# Patient Record
Sex: FEMALE | Employment: FULL TIME | URBAN - METROPOLITAN AREA
[De-identification: names, ages, dates, MRNs, and addresses within clinical notes are randomized per-mention and may not be internally consistent; named-entity substitution may affect disease eponyms.]

---

## 2019-04-09 ENCOUNTER — TRANSFERRED RECORDS (OUTPATIENT)
Dept: HEALTH INFORMATION MANAGEMENT | Facility: CLINIC | Age: 49
End: 2019-04-09

## 2019-05-20 ENCOUNTER — TRANSFERRED RECORDS (OUTPATIENT)
Dept: HEALTH INFORMATION MANAGEMENT | Facility: CLINIC | Age: 49
End: 2019-05-20

## 2019-06-07 ENCOUNTER — TRANSFERRED RECORDS (OUTPATIENT)
Dept: HEALTH INFORMATION MANAGEMENT | Facility: CLINIC | Age: 49
End: 2019-06-07

## 2019-06-24 ENCOUNTER — TRANSFERRED RECORDS (OUTPATIENT)
Dept: HEALTH INFORMATION MANAGEMENT | Facility: CLINIC | Age: 49
End: 2019-06-24

## 2019-07-15 ENCOUNTER — TRANSFERRED RECORDS (OUTPATIENT)
Dept: HEALTH INFORMATION MANAGEMENT | Facility: CLINIC | Age: 49
End: 2019-07-15

## 2019-08-25 NOTE — TELEPHONE ENCOUNTER
Action    Action Taken Sent over records request via fax to [facility] at [phone].  09.04.2019 Sent fax request to [facility]    09.05.2019 Called Varg[facility] montejo at  [phone] to Sheryl she states she will fax over the recs today the latest will be tomorrow.    09.06.2019 Called [facility] at [phone] spoke to Janny  She states she will send a message to Sheryl to verify if she sent the recs or if she is in the process of sending them.         amand  RECORDS RECEIVED FROM: Autoimmune hepatitis, ref by FEDERICO Goldberg at [facility], scheduled per pt   DATE RECEIVED: 09.09.2019   NOTES STATUS DETAILS   OFFICE NOTE from referring provider Received 09.09.2019 [facility]   OFFICE NOTES from other specialists N/A    DISCHARGE SUMMARY from hospital N/A    MEDICATION LIST Received 09.09.2019 [facility]   LIVER BIOSPY (IF APPLICABLE)      PATHOLOGY REPORTS  N/A    IMAGING     ENDOSCOPY (IF AVAILABLE) N/A    COLONOSCOPY (IF AVAILABLE) N/A    ULTRASOUND LIVER N/A    CT OF ABDOMEN N/A    MRI OF LIVER N/A    FIBROSCAN, US ELASTOGRAPHY, FIBROSIS SCAN, MR ELASTOGRAPHY N/A    LABS     HEPATIC PANEL (LIVER PANEL) Received 09.09.2019    BASIC METABOLIC PANEL N/A    COMPLETE METABOLIC PANEL Received 09.09.2019   COMPLETE BLOOD COUNT (CBC) N/A    INTERNATIONAL NORMALIZED RATIO (INR) N/A    HEPATITIS C ANTIBODY N/A    HEPATITIS C VIRAL LOAD/PCR N/A    HEPATITIS C GENOTYPE N/A    HEPATITIS B SURFACE ANTIGEN N/A    HEPATITIS B SURFACE ANTIBODY N/A    HEPATITIS B DNA QUANT LEVEL N/A    HEPATITIS B CORE ANTIBODY N/A            Medical Records Request For Appointment  URGENT!            To: [facility] Medical Records From: Jennifer Fontenot - Clinic   [facility]   Fax: [phone] Fax: [phone]   Date: 8/25/2019   Phone: [phone]   Pages: 1 Mailing Address: [facility]  Attn: Jennifer Fontenot, Clinic   [address], [id number]  [place]  [address]     PATIENT: Crista Cosme  : 1970  REFFERRED BY: FEDERICO Rangel Yusuf       Please fax medical records to fax # [phone] for patient s upcoming appointment in the Hepatology Clinic.     Patient is being seen for: Autoimmune hepatitis    PLEASE SEND:  TIME FRAME NEEDED:      Referring MD notes, office visit notes with any other related providers  3 to 4 years     Related Biopsy Reports All Dates     ED/UC/Hospital discharge notes 3 to 4 years     Medication List Current     Any Related Imaging:   MRI, CT, and US           *Reports and images*   3-4 years    Please push images to [employer] PACS or mail the imaging disc via [employer] #[id number](cost center number is to be populated in the reference field of the shipping label: [id number]) to the address above.  NON URGENT mail to the above address via [employer]       ** If no records related to Hepatology, please send most recent lab work and physical. **          Confidentiality Notice:  The document(s) accompanying this fax may contain protected health information under state and federal law and is legally privileged.  The information is only for the use of the intended recipient named above.  The recipient or person responsible for delivering this information is prohibited by law from disclosing this information without proper authorization to any other party, unless required to do so by law or regulation.  If you are not the intended recipient, you are hereby notified that any review, dissemination, distribution, or copying of this message is strictly prohibited.  If you have received this communication in error, please notify us immediately by telephone to arrange for the return orqrewstruction of the faxed documents.  Thank you.

## 2019-09-05 ENCOUNTER — TELEPHONE (OUTPATIENT)
Dept: GASTROENTEROLOGY | Facility: CLINIC | Age: 49
End: 2019-09-05

## 2019-09-05 NOTE — TELEPHONE ENCOUNTER
Patient contacted and reminded of upcoming appointment.  Patient confirmed they will be attending.  Patient instructed to bring updated medications list to appointment.    Chrissy Grady on 9/5/2019 at 2:47 PM

## 2019-09-09 ENCOUNTER — OFFICE VISIT (OUTPATIENT)
Dept: GASTROENTEROLOGY | Facility: CLINIC | Age: 49
End: 2019-09-09
Attending: INTERNAL MEDICINE
Payer: COMMERCIAL

## 2019-09-09 ENCOUNTER — PRE VISIT (OUTPATIENT)
Dept: GASTROENTEROLOGY | Facility: CLINIC | Age: 49
End: 2019-09-09

## 2019-09-09 VITALS
SYSTOLIC BLOOD PRESSURE: 134 MMHG | HEART RATE: 77 BPM | OXYGEN SATURATION: 100 % | TEMPERATURE: 97.4 F | WEIGHT: 167 LBS | DIASTOLIC BLOOD PRESSURE: 45 MMHG

## 2019-09-09 DIAGNOSIS — K75.4 AUTOIMMUNE HEPATITIS (H): ICD-10-CM

## 2019-09-09 DIAGNOSIS — K75.4 AUTOIMMUNE HEPATITIS (H): Primary | ICD-10-CM

## 2019-09-09 DIAGNOSIS — D84.9 IMMUNOSUPPRESSION (H): ICD-10-CM

## 2019-09-09 LAB
ALBUMIN SERPL-MCNC: 3.2 G/DL (ref 3.4–5)
ALP SERPL-CCNC: 74 U/L (ref 40–150)
ALT SERPL W P-5'-P-CCNC: 34 U/L (ref 0–50)
ANION GAP SERPL CALCULATED.3IONS-SCNC: 7 MMOL/L (ref 3–14)
AST SERPL W P-5'-P-CCNC: 23 U/L (ref 0–45)
BILIRUB DIRECT SERPL-MCNC: <0.1 MG/DL (ref 0–0.2)
BILIRUB SERPL-MCNC: 0.3 MG/DL (ref 0.2–1.3)
BUN SERPL-MCNC: 7 MG/DL (ref 7–30)
CALCIUM SERPL-MCNC: 8.2 MG/DL (ref 8.5–10.1)
CHLORIDE SERPL-SCNC: 105 MMOL/L (ref 94–109)
CO2 SERPL-SCNC: 22 MMOL/L (ref 20–32)
CREAT SERPL-MCNC: 0.7 MG/DL (ref 0.52–1.04)
ERYTHROCYTE [DISTWIDTH] IN BLOOD BY AUTOMATED COUNT: 14.1 % (ref 10–15)
GFR SERPL CREATININE-BSD FRML MDRD: >90 ML/MIN/{1.73_M2}
GLUCOSE SERPL-MCNC: 82 MG/DL (ref 70–99)
HCT VFR BLD AUTO: 35 % (ref 35–47)
HGB BLD-MCNC: 11.3 G/DL (ref 11.7–15.7)
IGG SERPL-MCNC: 1520 MG/DL (ref 695–1620)
MCH RBC QN AUTO: 28.3 PG (ref 26.5–33)
MCHC RBC AUTO-ENTMCNC: 32.3 G/DL (ref 31.5–36.5)
MCV RBC AUTO: 88 FL (ref 78–100)
PLATELET # BLD AUTO: 286 10E9/L (ref 150–450)
POTASSIUM SERPL-SCNC: 3.7 MMOL/L (ref 3.4–5.3)
PROT SERPL-MCNC: 7.3 G/DL (ref 6.8–8.8)
RBC # BLD AUTO: 4 10E12/L (ref 3.8–5.2)
SODIUM SERPL-SCNC: 134 MMOL/L (ref 133–144)
WBC # BLD AUTO: 7.9 10E9/L (ref 4–11)

## 2019-09-09 PROCEDURE — 85027 COMPLETE CBC AUTOMATED: CPT | Performed by: INTERNAL MEDICINE

## 2019-09-09 PROCEDURE — 80048 BASIC METABOLIC PNL TOTAL CA: CPT | Performed by: INTERNAL MEDICINE

## 2019-09-09 PROCEDURE — 86038 ANTINUCLEAR ANTIBODIES: CPT | Performed by: INTERNAL MEDICINE

## 2019-09-09 PROCEDURE — G0463 HOSPITAL OUTPT CLINIC VISIT: HCPCS | Mod: ZF

## 2019-09-09 PROCEDURE — 86256 FLUORESCENT ANTIBODY TITER: CPT | Performed by: INTERNAL MEDICINE

## 2019-09-09 PROCEDURE — 86039 ANTINUCLEAR ANTIBODIES (ANA): CPT | Performed by: INTERNAL MEDICINE

## 2019-09-09 PROCEDURE — 83516 IMMUNOASSAY NONANTIBODY: CPT | Performed by: INTERNAL MEDICINE

## 2019-09-09 PROCEDURE — 80076 HEPATIC FUNCTION PANEL: CPT | Performed by: INTERNAL MEDICINE

## 2019-09-09 PROCEDURE — 82784 ASSAY IGA/IGD/IGG/IGM EACH: CPT | Performed by: INTERNAL MEDICINE

## 2019-09-09 PROCEDURE — 36415 COLL VENOUS BLD VENIPUNCTURE: CPT | Performed by: INTERNAL MEDICINE

## 2019-09-09 RX ORDER — LEVOTHYROXINE SODIUM 50 UG/1
TABLET ORAL
Refills: 2 | COMMUNITY
Start: 2019-08-30

## 2019-09-09 RX ORDER — PREDNISONE 5 MG/1
TABLET ORAL
Refills: 1 | COMMUNITY
Start: 2019-07-11 | End: 2021-01-12

## 2019-09-09 SDOH — HEALTH STABILITY: MENTAL HEALTH: HOW OFTEN DO YOU HAVE A DRINK CONTAINING ALCOHOL?: MONTHLY OR LESS

## 2019-09-09 ASSESSMENT — PAIN SCALES - GENERAL: PAINLEVEL: NO PAIN (0)

## 2019-09-09 ASSESSMENT — PATIENT HEALTH QUESTIONNAIRE - PHQ9: SUM OF ALL RESPONSES TO PHQ QUESTIONS 1-9: 12

## 2019-09-09 NOTE — LETTER
"9/9/2019       RE: Crista Cosme  [address]  [facility] [address]     Dear Colleague,    Thank you for referring your patient, Crista Cosme, to the [facility] HEPATOLOGY at [facility]. Please see a copy of my visit note below.    Date of Service: 9/9/2019     Referring Provider: Claire Goldberg    Subjective:            Crista Cosme is a 49 year old female presenting for evaluation of abnormal liver tests    History of Present Illness   Crista Cosme is a 49 year old female with past medical history of dyslipidemia, depression, and who presents for a second opinion regarding a diagnosis of autoimmune hepatitis.    She notes she is dealt with chronic fatigue for much of her adult life, but this became acutely worse in everywhere in March 2019.  She ultimately presented to her primary provider with concerns of itching, and worsening fatigue.  Labs were checked on March 29 which demonstrated total bilirubin 0.4, , alkaline phosphatase 170, , double-stranded DNA antibody 12 (normal between 029), hepatitis B surface antigen negative, hepatitis C antibody negative, WENDY positive at 1: 640.  She then underwent an abdominal ultrasound in April 2019 which demonstrated \"liver has no overt evidence of hepatic steatosis.  No liver mass was visualized.  Liver measures 12.5 cm in craniocaudal dimension.  Patient is undergone a cholecystectomy.  The common bile duct measures 6.5 mm.  Pancreas appears unremarkable.\"  The patient was started on corticosteroid therapy at approximately 40 mg of prednisone and then was placed on a rapid taper.  Per review of outside records she did have improvement in overall liver tests.  Further labs were checked on May 20, 2019 which demonstrated an anti-smooth muscle antibody positive at 101, and serum IgG of 2170.  She was referred to gastroenterology with the [facility] and was seen in [place], [place].  A liver biopsy was " "recommended and was performed on June 24, 2019.  This exam demonstrated \"sections show liver needle biopsy tissue measuring 2.4 cm containing approximately 15 portal tracts.  Some of the portal tracts show increased chronic inflammatory cells including numerous plasma cells and some small lymphocytes and occasional eosinophils.  The bile ducts are normal.  There is minimal interface hepatitis.  Less than 15% macrovesicular steatosis is present.  There is no ballooning degeneration.  Lobular inflammation is minimal.  There is no bridget formation, there is no cholestasis, there is no fibrosis.\"    While there was discussion to start the patient on azathioprine, she has not started this medication at this time.  She is currently on prednisone 5 mg every other day as part of a wean.    She denies a significant history of alcohol misuse, denies history of IV or inhaled drugs of abuse.  She currently works in a [employer] at [employer] in [place], and lives in [place], [place].    There is no overt family history of autoimmune disease or liver disease.    Past Medical History:  Past Medical History:   Diagnosis Date     Autoimmune hepatitis (H)      Dyslipidemia      Hypothyroidism        Surgical History:  Past Surgical History:   Procedure Laterality Date     LAPAROSCOPIC CHOLECYSTECTOMY  03/30/2017     LIVER BIOPSY  06/24/2019    [facility]     TUBAL LIGATION         Social History:  Social History     Tobacco Use     Smoking status: Former Smoker     Types: Other     Smokeless tobacco: Never Used     Tobacco comment: Former vaper quit 02/2018   Substance Use Topics     Alcohol use: Yes     Frequency: Monthly or less     Comment: rare     Drug use: Never        Family History:  Family History   Problem Relation Age of Onset     Colon Cancer No family hx of      Autoimmune Disease No family hx of        Medications:  Current Outpatient Medications   Medication     FLUoxetine (PROZAC) 20 MG capsule     " levothyroxine (SYNTHROID/LEVOTHROID) 50 MCG tablet     predniSONE (DELTASONE) 5 MG tablet     No current facility-administered medications for this visit.        Review of Systems  A complete 10 point review of systems was asked and answered in the negative unless specifically commented upon in the HPI    Objective:         Vitals:    09/09/19 0824   BP: 134/45   BP Location: Right arm   Patient Position: Sitting   Cuff Size: Adult Regular   Pulse: 77   Temp: 97.4  F (36.3  C)   TempSrc: Oral   SpO2: 100%   Weight: 75.8 kg (167 lb)     There is no height or weight on file to calculate BMI.     Physical Exam    Vitals reviewed.   Constitutional: Well-developed, well-nourished, in no apparent distress.    HEENT: Normocephalic. No scleral icterus. Moist oral mucosa. Dentition normal  Neck/Lymph: Normal ROM, supple. No thyromegaly.  No lymphadenopathy  Cardiac:  Regular rate and rhythm.  No overt murmurs  Respiratory: Clear to auscultation bilaterally.  No wheezes or rales  GI:  Abdomen soft, non-distended, non-tender. BS present. no shifting dullness. No overt hepatosplenomegaly.     Skin:  Skin is warm and dry. No rash noted.  no jaundice. no spider nevi noted.  no palmar erythema  Peripheral Vascular: no lower extremity edema. 2+ pulses in all extremities  Musculoskeletal:  ROM intact, normal muscle bulk    Psychiatric: Normal mood and affect. Behavior is normal.  Neuro:  no asterixis, no tremor    Labs and Diagnostic tests:  Reviewed per outside records and EPIC  RNP antibody negative, soluble liver antigen antibody negative liver kidney microsomal 1 antibody negative, antimitochondrial antibody 5.9 TTG negative, c-ANCA negative, p-ANCA negative  Serum TPMT: 27    Imaging:  Abd US 4/2019  liver has no overt evidence of hepatic steatosis.  No liver mass was visualized.  Liver measures 12.5 cm in craniocaudal dimension.  Patient is undergone a cholecystectomy.  The common bile duct measures 6.5 mm.  Pancreas appears  unremarkable.    Procedures:  Liver biopsy: June 24, 2019.    sections show liver needle biopsy tissue measuring 2.4 cm containing approximately 15 portal tracts.  Some of the portal tracts show increased chronic inflammatory cells including numerous plasma cells and some small lymphocytes and occasional eosinophils.  The bile ducts are normal.  There is minimal interface hepatitis.  Less than 15% macrovesicular steatosis is present.  There is no ballooning degeneration.  Lobular inflammation is minimal.  There is no bridget formation, there is no cholestasis, there is no fibrosis.      Assessment and Plan:    Question of autoimmune hepatitis:    -Patient available information including labs and liver biopsy, I do believe that the patient does have criteria and does meet diagnostic criteria for a diagnosis of autoimmune hepatitis  -While is unclear as to the diagnosis of lupus, given her significantly elevated serum WENDY at 1: 640, her significantly elevated smooth muscle antibody at 101, and a liver biopsy which demonstrates a lymphocytic and plasma cell rich infiltrate with mild interface hepatitis this would be most consistent with a diagnosis of autoimmune hepatitis.  -Noted that she has been on a prednisone taper, and in the setting of this tapers had a dramatic improvement in her liver tests  -Felt that in addition to prednisone, would be prudent to start the patient on azathioprine as a means of chronic maintenance.  -I discussed with the patient the natural history of autoimmune hepatitis and her typical plan for following her: Labs monthly to ensure quiesence of disease, then every 3 months to ensure minimal toxicity and control of disease.  -We will repeat screening labs today for LFTs, a baseline CBC, and autoimmune markers to assess for disease activity    Immunosuppression:  -She presents today on prednisone 5 mg every other day  -She is a T PMT which has been 27, well within normal range  -I will plan  to review her liver tests and autoimmune markers and come up with a plan for further treatment of her autoimmune hepatitis  -We will likely recommend 5 mg daily and will plan to add in azathioprine 50 mg daily  -Patient was informed of potential side effects of prednisone including but not limited to: Diabetes, weight gain, osteoporosis, cataracts  -Patient was informed of potential side effects of azathioprine including but not limited to: Fevers, pancreatitis, bone marrow suppression, risk of lymphoma or leukemia.  Also discussed risk of birth effects, but patient status post surgical contraception      Routine Health Care in Patient with Chronic Liver Disease:  - We recommend screening for hepatitis A and B, please vaccinate if not immune  - All patients with liver disease, particularly those with cholestatic liver disease, are at an increased risk for osteoporosis.  We strongly recommend screening for Vitamin D deficiency at least twice yearly with aggressive supplementation/replacement as indicated.    - We also recommend a screening DEXA scan to evaluate for osteoporosis.  If present, should treat with calcium, Vitamin D supplementation, and recommend consideration of bisphosphonate therapy.  Also recommend follow up DEXA scans to evaluate for improvement of bone density on therapy.  - All patients with liver disease should avoid the use of Non-steroidal Anti-Inflammatory (NSAID) medications as they can cause significant injury to the kidneys in this population    Follow Up:  4 months     Thank you very much for the opportunity to participate in the care of this patient.  If you have any further questions, please don't hesitate to contact our office.    Thomas M. Leventhal, M.D.   of Medicine  Advanced & Transplant Hepatology  [facility]     Approximately 35 minutes of non face-to-face time were spent in review of the patient's medical record to date.  This  included review of previous: clinic visits, hospital records, lab results, imaging studies, and procedural documentation.  The findings from this review are summarized in the above note.      Again, thank you for allowing me to participate in the care of your patient.      Sincerely,    Thomas M. Leventhal, MD

## 2019-09-09 NOTE — NURSING NOTE
Chief Complaint   Patient presents with     Consult     Autoimmune Hepatitis     /45 (BP Location: Right arm, Patient Position: Sitting, Cuff Size: Adult Regular)   Pulse 77   Temp 97.4  F (36.3  C) (Oral)   Wt 75.8 kg (167 lb)   SpO2 100%     Beatrice Lieberman CMA CMA    9/9/2019 8:29 AM

## 2019-09-09 NOTE — PATIENT INSTRUCTIONS
- get labs today on the first floor    - Give you an order for monthly labs to be done locally    - I will call you with lab results and treatment plan    - So we will use 2 medications as the main plan for treatment: prednisone and azathioprine (Imuran)    - Come back to see me in 4 months

## 2019-09-09 NOTE — PROGRESS NOTES
"Date of Service: 9/9/2019     Referring Provider: Claire Goldberg    Subjective:            Crista Cosme is a 49 year old female presenting for evaluation of abnormal liver tests    History of Present Illness   Crista Cosme is a 49 year old female with past medical history of dyslipidemia, depression, and who presents for a second opinion regarding a diagnosis of autoimmune hepatitis.    She notes she is dealt with chronic fatigue for much of her adult life, but this became acutely worse in everywhere in March 2019.  She ultimately presented to her primary provider with concerns of itching, and worsening fatigue.  Labs were checked on March 29 which demonstrated total bilirubin 0.4, , alkaline phosphatase 170, , double-stranded DNA antibody 12 (normal between 029), hepatitis B surface antigen negative, hepatitis C antibody negative, WENDY positive at 1: 640.  She then underwent an abdominal ultrasound in April 2019 which demonstrated \"liver has no overt evidence of hepatic steatosis.  No liver mass was visualized.  Liver measures 12.5 cm in craniocaudal dimension.  Patient is undergone a cholecystectomy.  The common bile duct measures 6.5 mm.  Pancreas appears unremarkable.\"  The patient was started on corticosteroid therapy at approximately 40 mg of prednisone and then was placed on a rapid taper.  Per review of outside records she did have improvement in overall liver tests.  Further labs were checked on May 20, 2019 which demonstrated an anti-smooth muscle antibody positive at 101, and serum IgG of 2170.  She was referred to gastroenterology with the [facility] and was seen in [place], [place].  A liver biopsy was recommended and was performed on June 24, 2019.  This exam demonstrated \"sections show liver needle biopsy tissue measuring 2.4 cm containing approximately 15 portal tracts.  Some of the portal tracts show increased chronic inflammatory cells including numerous plasma cells and " "some small lymphocytes and occasional eosinophils.  The bile ducts are normal.  There is minimal interface hepatitis.  Less than 15% macrovesicular steatosis is present.  There is no ballooning degeneration.  Lobular inflammation is minimal.  There is no bridget formation, there is no cholestasis, there is no fibrosis.\"    While there was discussion to start the patient on azathioprine, she has not started this medication at this time.  She is currently on prednisone 5 mg every other day as part of a wean.    She denies a significant history of alcohol misuse, denies history of IV or inhaled drugs of abuse.  She currently works in a [employer] at [email or web address] in [place], and lives in [place], [place].    There is no overt family history of autoimmune disease or liver disease.    Past Medical History:  Past Medical History:   Diagnosis Date     Autoimmune hepatitis (H)      Dyslipidemia      Hypothyroidism        Surgical History:  Past Surgical History:   Procedure Laterality Date     LAPAROSCOPIC CHOLECYSTECTOMY  03/30/2017     LIVER BIOPSY  06/24/2019    [facility]     TUBAL LIGATION         Social History:  Social History     Tobacco Use     Smoking status: Former Smoker     Types: Other     Smokeless tobacco: Never Used     Tobacco comment: Former vaper quit 02/2018   Substance Use Topics     Alcohol use: Yes     Frequency: Monthly or less     Comment: rare     Drug use: Never        Family History:  Family History   Problem Relation Age of Onset     Colon Cancer No family hx of      Autoimmune Disease No family hx of        Medications:  Current Outpatient Medications   Medication     FLUoxetine (PROZAC) 20 MG capsule     levothyroxine (SYNTHROID/LEVOTHROID) 50 MCG tablet     predniSONE (DELTASONE) 5 MG tablet     No current facility-administered medications for this visit.        Review of Systems  A complete 10 point review of systems was asked and answered in the negative unless specifically commented " upon in the [facility]    Objective:         Vitals:    09/09/19 0824   BP: 134/45   BP Location: Right arm   Patient Position: Sitting   Cuff Size: Adult Regular   Pulse: 77   Temp: 97.4  F (36.3  C)   TempSrc: Oral   SpO2: 100%   Weight: 75.8 kg (167 lb)     There is no height or weight on file to calculate BMI.     Physical Exam    Vitals reviewed.   Constitutional: Well-developed, well-nourished, in no apparent distress.    HEENT: Normocephalic. No scleral icterus. Moist oral mucosa. Dentition normal  Neck/Lymph: Normal ROM, supple. No thyromegaly.  No lymphadenopathy  Cardiac:  Regular rate and rhythm.  No overt murmurs  Respiratory: Clear to auscultation bilaterally.  No wheezes or rales  GI:  Abdomen soft, non-distended, non-tender. BS present. no shifting dullness. No overt hepatosplenomegaly.     Skin:  Skin is warm and dry. No rash noted.  no jaundice. no spider nevi noted.  no palmar erythema  Peripheral Vascular: no lower extremity edema. 2+ pulses in all extremities  Musculoskeletal:  ROM intact, normal muscle bulk    Psychiatric: Normal mood and affect. Behavior is normal.  Neuro:  no asterixis, no tremor    Labs and Diagnostic tests:  Reviewed per outside records and EPIC  RNP antibody negative, soluble liver antigen antibody negative liver kidney microsomal 1 antibody negative, antimitochondrial antibody 5.9 TTG negative, c-ANCA negative, p-ANCA negative  Serum TPMT: 27    Imaging:  Abd US 4/2019  liver has no overt evidence of hepatic steatosis.  No liver mass was visualized.  Liver measures 12.5 cm in craniocaudal dimension.  Patient is undergone a cholecystectomy.  The common bile duct measures 6.5 mm.  Pancreas appears unremarkable.    Procedures:  Liver biopsy: June 24, 2019.    sections show liver needle biopsy tissue measuring 2.4 cm containing approximately 15 portal tracts.  Some of the portal tracts show increased chronic inflammatory cells including numerous plasma cells and some small  lymphocytes and occasional eosinophils.  The bile ducts are normal.  There is minimal interface hepatitis.  Less than 15% macrovesicular steatosis is present.  There is no ballooning degeneration.  Lobular inflammation is minimal.  There is no bridget formation, there is no cholestasis, there is no fibrosis.      Assessment and Plan:    Question of autoimmune hepatitis:    -Patient available information including labs and liver biopsy, I do believe that the patient does have criteria and does meet diagnostic criteria for a diagnosis of autoimmune hepatitis  -While is unclear as to the diagnosis of lupus, given her significantly elevated serum WENDY at 1: 640, her significantly elevated smooth muscle antibody at 101, and a liver biopsy which demonstrates a lymphocytic and plasma cell rich infiltrate with mild interface hepatitis this would be most consistent with a diagnosis of autoimmune hepatitis.  -Noted that she has been on a prednisone taper, and in the setting of this tapers had a dramatic improvement in her liver tests  -Felt that in addition to prednisone, would be prudent to start the patient on azathioprine as a means of chronic maintenance.  -I discussed with the patient the natural history of autoimmune hepatitis and her typical plan for following her: Labs monthly to ensure quiesence of disease, then every 3 months to ensure minimal toxicity and control of disease.  -We will repeat screening labs today for LFTs, a baseline CBC, and autoimmune markers to assess for disease activity    Immunosuppression:  -She presents today on prednisone 5 mg every other day  -She is a T PMT which has been 27, well within normal range  -I will plan to review her liver tests and autoimmune markers and come up with a plan for further treatment of her autoimmune hepatitis  -We will likely recommend 5 mg daily and will plan to add in azathioprine 50 mg daily  -Patient was informed of potential side effects of prednisone  including but not limited to: Diabetes, weight gain, osteoporosis, cataracts  -Patient was informed of potential side effects of azathioprine including but not limited to: Fevers, pancreatitis, bone marrow suppression, risk of lymphoma or leukemia.  Also discussed risk of birth effects, but patient status post surgical contraception      Routine Health Care in Patient with Chronic Liver Disease:  - We recommend screening for hepatitis A and B, please vaccinate if not immune  - All patients with liver disease, particularly those with cholestatic liver disease, are at an increased risk for osteoporosis.  We strongly recommend screening for Vitamin D deficiency at least twice yearly with aggressive supplementation/replacement as indicated.    - We also recommend a screening DEXA scan to evaluate for osteoporosis.  If present, should treat with calcium, Vitamin D supplementation, and recommend consideration of bisphosphonate therapy.  Also recommend follow up DEXA scans to evaluate for improvement of bone density on therapy.  - All patients with liver disease should avoid the use of Non-steroidal Anti-Inflammatory (NSAID) medications as they can cause significant injury to the kidneys in this population    Follow Up:  4 months     Thank you very much for the opportunity to participate in the care of this patient.  If you have any further questions, please don't hesitate to contact our office.    Thomas M. Leventhal, M.D.   of Medicine  Advanced & Transplant Hepatology  [facility]     Approximately 35 minutes of non face-to-face time were spent in review of the patient's medical record to date.  This included review of previous: clinic visits, hospital records, lab results, imaging studies, and procedural documentation.  The findings from this review are summarized in the above note.

## 2019-09-10 DIAGNOSIS — K75.4 AUTOIMMUNE HEPATITIS (H): Primary | ICD-10-CM

## 2019-09-10 LAB
ANA PAT SER IF-IMP: ABNORMAL
ANA SER QL IF: POSITIVE
ANA TITR SER IF: ABNORMAL {TITER}

## 2019-09-10 RX ORDER — AZATHIOPRINE 50 MG/1
50 TABLET ORAL DAILY
Qty: 30 TABLET | Refills: 11 | Status: SHIPPED | OUTPATIENT
Start: 2019-09-10 | End: 2020-08-11

## 2019-09-10 RX ORDER — PREDNISONE 5 MG/1
5 TABLET ORAL DAILY
Qty: 30 TABLET | Refills: 11 | Status: SHIPPED | OUTPATIENT
Start: 2019-09-10 | End: 2021-01-11

## 2019-09-10 NOTE — PROGRESS NOTES
Reviewed patient's labs  - Noted WENDY 1:1,280  - Ig    Felt consistent with autoimmune hepatitis    Will order the following medications:  - Prednisone 5mg daily  - Azathioprine 50mg daily    Thomas M. Leventhal, M.D.   of Medicine  Advanced & Transplant Hepatology  [facility]

## 2019-09-11 LAB
SMA IGG SER-ACNC: 42 UNITS (ref 0–19)
SMOOTH MUSCLE ABY IGG TITER: NORMAL

## 2019-10-07 ENCOUNTER — TRANSFERRED RECORDS (OUTPATIENT)
Dept: HEALTH INFORMATION MANAGEMENT | Facility: CLINIC | Age: 49
End: 2019-10-07

## 2019-10-24 ENCOUNTER — TELEPHONE (OUTPATIENT)
Dept: GASTROENTEROLOGY | Facility: CLINIC | Age: 49
End: 2019-10-24

## 2019-10-24 ENCOUNTER — DOCUMENTATION ONLY (OUTPATIENT)
Dept: GASTROENTEROLOGY | Facility: CLINIC | Age: 49
End: 2019-10-24

## 2019-10-24 NOTE — TELEPHONE ENCOUNTER
DOMENICA reminding patient she is due for follow-up labs, liver panel and IGG, ordered by Dr. Leventhal. Asked that patient have drawn this week or early next. Asked that patient call writer back or send [employer] message with any questions.    Krissy Reynoso LPN  Hepatology Clinic

## 2019-10-31 NOTE — TELEPHONE ENCOUNTER
Writer refaxed labs and updated patient. Per patient plan is to get labs drawn early next week.      Krissy Reynoso LPN  Hepatology Clinic      [facility]    Phone Message    May a detailed message be left on voicemail: yes    Reason for Call: Other: Patient called and stated that the clinic where she does the labs told her they need a written order signed by Dr.Leventhal their fax number is [phone]. Please follow up with the patient with any questions she will do these labs on monday.     Action Taken: Message routed to:  [facility] ([facility] hep

## 2019-11-04 ENCOUNTER — TRANSFERRED RECORDS (OUTPATIENT)
Dept: HEALTH INFORMATION MANAGEMENT | Facility: CLINIC | Age: 49
End: 2019-11-04

## 2019-11-05 ENCOUNTER — EXTERNAL ORDER RESULTS (OUTPATIENT)
Dept: GASTROENTEROLOGY | Facility: CLINIC | Age: 49
End: 2019-11-05

## 2019-11-05 LAB
ALBUMIN SERPL-MCNC: 3.4 G/DL
ALP SERPL-CCNC: 63 U/L
ALT SERPL-CCNC: 32 U/L
AST SERPL-CCNC: 21 U/L
BILIRUB SERPL-MCNC: 0.2 MG/DL
BILIRUBIN DIRECT: 0 MG/DL
IGG: 1572 MG/DL (ref 617–1344)
PROT SERPL-MCNC: 7.4 G/DL

## 2019-12-31 DIAGNOSIS — K75.4 AUTOIMMUNE HEPATITIS (H): Primary | ICD-10-CM

## 2020-01-09 ENCOUNTER — TELEPHONE (OUTPATIENT)
Dept: GASTROENTEROLOGY | Facility: CLINIC | Age: 50
End: 2020-01-09

## 2020-01-09 NOTE — TELEPHONE ENCOUNTER
Patient contacted and reminded of upcoming appointment.  Patient confirmed they will be attending.  Patient instructed to bring updated medications list to appointment.    Chrissy Grady on 1/9/2020 at 2:41 PM

## 2020-01-13 ENCOUNTER — OFFICE VISIT (OUTPATIENT)
Dept: GASTROENTEROLOGY | Facility: CLINIC | Age: 50
End: 2020-01-13
Attending: INTERNAL MEDICINE
Payer: COMMERCIAL

## 2020-01-13 VITALS
DIASTOLIC BLOOD PRESSURE: 79 MMHG | WEIGHT: 170.8 LBS | HEIGHT: 64 IN | BODY MASS INDEX: 29.16 KG/M2 | SYSTOLIC BLOOD PRESSURE: 127 MMHG | OXYGEN SATURATION: 96 % | TEMPERATURE: 97.8 F | HEART RATE: 71 BPM

## 2020-01-13 DIAGNOSIS — K75.4 AUTOIMMUNE HEPATITIS (H): ICD-10-CM

## 2020-01-13 DIAGNOSIS — Z23 NEED FOR PROPHYLACTIC VACCINATION AND INOCULATION AGAINST INFLUENZA: Primary | ICD-10-CM

## 2020-01-13 DIAGNOSIS — D84.9 IMMUNOSUPPRESSION (H): ICD-10-CM

## 2020-01-13 LAB
ALBUMIN SERPL-MCNC: 3.5 G/DL (ref 3.4–5)
ALP SERPL-CCNC: 74 U/L (ref 40–150)
ALT SERPL W P-5'-P-CCNC: 28 U/L (ref 0–50)
ANION GAP SERPL CALCULATED.3IONS-SCNC: 5 MMOL/L (ref 3–14)
AST SERPL W P-5'-P-CCNC: 15 U/L (ref 0–45)
BILIRUB DIRECT SERPL-MCNC: <0.1 MG/DL (ref 0–0.2)
BILIRUB SERPL-MCNC: 0.3 MG/DL (ref 0.2–1.3)
BUN SERPL-MCNC: 10 MG/DL (ref 7–30)
CALCIUM SERPL-MCNC: 8.5 MG/DL (ref 8.5–10.1)
CHLORIDE SERPL-SCNC: 108 MMOL/L (ref 94–109)
CO2 SERPL-SCNC: 25 MMOL/L (ref 20–32)
CREAT SERPL-MCNC: 0.68 MG/DL (ref 0.52–1.04)
ERYTHROCYTE [DISTWIDTH] IN BLOOD BY AUTOMATED COUNT: 12.6 % (ref 10–15)
GFR SERPL CREATININE-BSD FRML MDRD: >90 ML/MIN/{1.73_M2}
GLUCOSE SERPL-MCNC: 118 MG/DL (ref 70–99)
HCT VFR BLD AUTO: 40.1 % (ref 35–47)
HGB BLD-MCNC: 13.9 G/DL (ref 11.7–15.7)
INR PPP: 1.05 (ref 0.86–1.14)
MCH RBC QN AUTO: 34 PG (ref 26.5–33)
MCHC RBC AUTO-ENTMCNC: 34.7 G/DL (ref 31.5–36.5)
MCV RBC AUTO: 98 FL (ref 78–100)
PLATELET # BLD AUTO: 249 10E9/L (ref 150–450)
POTASSIUM SERPL-SCNC: 3.8 MMOL/L (ref 3.4–5.3)
PROT SERPL-MCNC: 7.4 G/DL (ref 6.8–8.8)
RBC # BLD AUTO: 4.09 10E12/L (ref 3.8–5.2)
SODIUM SERPL-SCNC: 138 MMOL/L (ref 133–144)
WBC # BLD AUTO: 8.4 10E9/L (ref 4–11)

## 2020-01-13 PROCEDURE — 82784 ASSAY IGA/IGD/IGG/IGM EACH: CPT | Performed by: INTERNAL MEDICINE

## 2020-01-13 PROCEDURE — 80048 BASIC METABOLIC PNL TOTAL CA: CPT | Performed by: INTERNAL MEDICINE

## 2020-01-13 PROCEDURE — 25000128 H RX IP 250 OP 636: Mod: ZF | Performed by: INTERNAL MEDICINE

## 2020-01-13 PROCEDURE — 85027 COMPLETE CBC AUTOMATED: CPT | Performed by: INTERNAL MEDICINE

## 2020-01-13 PROCEDURE — 36415 COLL VENOUS BLD VENIPUNCTURE: CPT | Performed by: INTERNAL MEDICINE

## 2020-01-13 PROCEDURE — G0463 HOSPITAL OUTPT CLINIC VISIT: HCPCS | Mod: 25,ZF

## 2020-01-13 PROCEDURE — 85610 PROTHROMBIN TIME: CPT | Performed by: INTERNAL MEDICINE

## 2020-01-13 PROCEDURE — 90686 IIV4 VACC NO PRSV 0.5 ML IM: CPT | Mod: ZF | Performed by: INTERNAL MEDICINE

## 2020-01-13 PROCEDURE — G0008 ADMIN INFLUENZA VIRUS VAC: HCPCS | Mod: ZF

## 2020-01-13 PROCEDURE — 80076 HEPATIC FUNCTION PANEL: CPT | Performed by: INTERNAL MEDICINE

## 2020-01-13 RX ORDER — FENOFIBRATE 145 MG/1
TABLET, COATED ORAL DAILY
Refills: 2 | COMMUNITY
Start: 2019-09-17

## 2020-01-13 RX ORDER — FERROUS SULFATE 325(65) MG
325 TABLET ORAL DAILY
COMMUNITY

## 2020-01-13 RX ORDER — CALCIUM CARBONATE 300MG(750)
TABLET,CHEWABLE ORAL DAILY
COMMUNITY

## 2020-01-13 RX ORDER — PREDNISONE 1 MG/1
4 TABLET ORAL DAILY
Qty: 120 TABLET | Refills: 11 | Status: SHIPPED | OUTPATIENT
Start: 2020-01-13 | End: 2020-08-11

## 2020-01-13 RX ADMIN — INFLUENZA A VIRUS A/BRISBANE/02/2018 IVR-190 (H1N1) ANTIGEN (FORMALDEHYDE INACTIVATED), INFLUENZA A VIRUS A/KANSAS/14/2017 X-327 (H3N2) ANTIGEN (FORMALDEHYDE INACTIVATED), INFLUENZA B VIRUS B/PHUKET/3073/2013 ANTIGEN (FORMALDEHYDE INACTIVATED), AND INFLUENZA B VIRUS B/MARYLAND/15/2016 BX-69A ANTIGEN (FORMALDEHYDE INACTIVATED) 0.5 ML: 15; 15; 15; 15 INJECTION, SUSPENSION INTRAMUSCULAR at 14:07

## 2020-01-13 ASSESSMENT — PAIN SCALES - GENERAL: PAINLEVEL: NO PAIN (0)

## 2020-01-13 ASSESSMENT — MIFFLIN-ST. JEOR: SCORE: 1384.74

## 2020-01-13 NOTE — NURSING NOTE
"Chief Complaint   Patient presents with     RECHECK     Autoimmune hepatitis      /79   Pulse 71   Temp 97.8  F (36.6  C) (Oral)   Ht 1.626 m (5' 4\")   Wt 77.5 kg (170 lb 12.8 oz)   SpO2 96%   BMI 29.32 kg/m    Mely Jordan, [facility]  1/13/2020 1:38 PM    "

## 2020-01-13 NOTE — PATIENT INSTRUCTIONS
- You will have an order for liver tests every 1 months    - Get your labs checked every 2-3 months    - Decrease prednisone dose to 4mg daily

## 2020-01-13 NOTE — LETTER
1/13/2020      RE: Crista Cosme  [address]  [facility] [address]       Date of Service: January 13, 2020     Subjective:            Crista Cosme is a 49 year old female presenting for follow up of liver disease    History of Present Illness   Crista Cosme is a 49 year old female with past medical history of dyslipidemia, depression, and who presents in follow up of her biopsy proven autoimmune hepatitis.    When she was last seen in clinic we felt that she did meet a criteria for the diagnosis of autoimmune hepatitis.  We started on azathioprine 50 mg daily and she is tolerated this medication without significant side effects.  She continues on prednisone 5 mg daily.  She has had follow-up with an outpatient rheumatologist and felt that she did not have any overt medical symptoms from the perspective of a diagnosis of lupus that would warrant further treatment or increased immunosuppression at this time.    Denies overt fevers or chills, notes that she has not yet had her influenza vaccine and is requesting one today.  She denies having issues with abdominal pain, nausea or vomiting.    Past Medical History:  Past Medical History:   Diagnosis Date     Autoimmune hepatitis (H)      Dyslipidemia      Hypothyroidism        Surgical History:  Past Surgical History:   Procedure Laterality Date     LAPAROSCOPIC CHOLECYSTECTOMY  03/30/2017     LIVER BIOPSY  06/24/2019    [facility]     TUBAL LIGATION         Social History:  Social History     Tobacco Use     Smoking status: Former Smoker     Types: Other     Smokeless tobacco: Never Used     Tobacco comment: Former vaper quit 02/2018   Substance Use Topics     Alcohol use: Yes     Frequency: Monthly or less     Comment: rare     Drug use: Never        Family History:  Family History   Problem Relation Age of Onset     Colon Cancer No family hx of      Autoimmune Disease No family hx of        Medications:  Current Outpatient Medications   Medication      "azaTHIOprine (IMURAN) 50 MG tablet     Cholecalciferol (VITAMIN D3) 25 MCG (1000 UT) CHEW     fenofibrate (TRICOR) 145 MG tablet     ferrous sulfate (FEROSUL) 325 (65 Fe) MG tablet     FLUoxetine (PROZAC) 20 MG capsule     levothyroxine (SYNTHROID/LEVOTHROID) 50 MCG tablet     predniSONE (DELTASONE) 1 MG tablet     predniSONE (DELTASONE) 5 MG tablet     predniSONE (DELTASONE) 5 MG tablet     No current facility-administered medications for this visit.        Review of Systems  A complete 10 point review of systems was asked and answered in the negative unless specifically commented upon in the HPI    Objective:         Vitals:    01/13/20 1332   BP: 127/79   Pulse: 71   Temp: 97.8  F (36.6  C)   TempSrc: Oral   SpO2: 96%   Weight: 77.5 kg (170 lb 12.8 oz)   Height: 1.626 m (5' 4\")     Body mass index is 29.32 kg/m .     Physical Exam    Vitals reviewed.   Constitutional: Well-developed, well-nourished, in no apparent distress.    HEENT: Normocephalic. No scleral icterus.  Dentition normal  Neck/Lymph: Normal ROM, supple.  Cardiac:  Regular rate and rhythm.    Respiratory: Normal respiratory excursion   GI:  Abdomen soft, non-distended, non-tender. BS present.   Skin:  Skin is warm and dry. No rash noted.    Peripheral Vascular: no lower extremity edema.   Musculoskeletal:  ROM intact, normal muscle bulk    Psychiatric: Normal mood and affect. Behavior is normal.  Neuro:  no asterixis, no tremor    Labs and Diagnostic tests:  Results for JUAN GILL (MRN [id number]) as of 1/13/2020 17:35   Ref. Range 11/4/2019 00:00 1/13/2020 11:51   Albumin Latest Ref Range: 3.4 - 5.0 g/dL 3.4 3.5   Protein Total Latest Ref Range: 6.8 - 8.8 g/dL 7.4 7.4   Bilirubin Total Latest Ref Range: 0.2 - 1.3 mg/dL 0.2 0.3   Alkaline Phosphatase Latest Ref Range: 40 - 150 U/L 63 74   ALT Latest Ref Range: 0 - 50 U/L 32 28   AST Latest Ref Range: 0 - 45 U/L 21 15         Imaging:  Abd US 4/2019  liver has no overt evidence of hepatic " steatosis.  No liver mass was visualized.  Liver measures 12.5 cm in craniocaudal dimension.  Patient is undergone a cholecystectomy.  The common bile duct measures 6.5 mm.  Pancreas appears unremarkable.    Procedures:  Liver biopsy: June 24, 2019.    sections show liver needle biopsy tissue measuring 2.4 cm containing approximately 15 portal tracts.  Some of the portal tracts show increased chronic inflammatory cells including numerous plasma cells and some small lymphocytes and occasional eosinophils.  The bile ducts are normal.  There is minimal interface hepatitis.  Less than 15% macrovesicular steatosis is present.  There is no ballooning degeneration.  Lobular inflammation is minimal.  There is no bridget formation, there is no cholestasis, there is no fibrosis.      Assessment and Plan:    Autoimmune hepatitis:    - Based on labs and liver biopsy,she does meet diagnostic criteria for a diagnosis of autoimmune hepatitis  -She is responded incredibly well to the initial steroid burst and now on a controller medication with azathioprine 50 mg daily  -Her ALT and AST are well within normal limits.  As per my practice, I do check serum IgG levels with each liver test as a surrogate for immune activity; pending for today's visit  -Discussed with the patient practice that we will attempt to wean her off of all immunosuppression at sometime in the future, however, we do like to see a prolonged consolidation phase with a decrease in overall immune activity to give the patient the best chance of having quiescent disease  -We will continue to follow labs every 2 to 3 months to assess for disease activity    Immunosuppression:  -She presents today on azathioprine 50 mg daily and prednisone 5 mg daily  -She has a TPMT = 27  -I will plan to review her liver tests and autoimmune markers and come up with a plan for further treatment of her autoimmune hepatitis  -Based on her normalized liver tests we will plan to wean the  patient off of prednisone.  She is been on for the better part of 1 year we will plan for slow taper, starting her on 4 mg daily with planned 1 mg taper every 2 to 3 months until she is discontinued the medication  -Patient was informed of potential side effects of prednisone including but not limited to: Diabetes, weight gain, osteoporosis, cataracts  -Patient was informed of potential side effects of azathioprine including but not limited to: Fevers, pancreatitis, bone marrow suppression, risk of lymphoma or leukemia.  Also discussed risk of birth effects, but patient status post surgical contraception  -We will continue to follow labs every 2 to 3 months, including labs to assess for toxicity of her immunosuppression    Routine Health Care in Patient with Chronic Liver Disease:  - We recommend screening for hepatitis A and B, please vaccinate if not immune  - All patients with liver disease, particularly those with cholestatic liver disease, are at an increased risk for osteoporosis.  We strongly recommend screening for Vitamin D deficiency at least twice yearly with aggressive supplementation/replacement as indicated.    - We also recommend a screening DEXA scan to evaluate for osteoporosis.  If present, should treat with calcium, Vitamin D supplementation, and recommend consideration of bisphosphonate therapy.  Also recommend follow up DEXA scans to evaluate for improvement of bone density on therapy.  - All patients with liver disease should avoid the use of Non-steroidal Anti-Inflammatory (NSAID) medications as they can cause significant injury to the kidneys in this population    Follow Up:  12 months     Thank you very much for the opportunity to participate in the care of this patient.  If you have any further questions, please don't hesitate to contact our office.    Thomas M. Leventhal, M.D.   of Medicine  Advanced & Transplant Hepatology  [facility]  [place]      Thomas M. Leventhal, MD

## 2020-01-13 NOTE — PROGRESS NOTES
Date of Service: January 13, 2020     Subjective:            Crista Cosme is a 49 year old female presenting for follow up of liver disease    History of Present Illness   Crista Cosme is a 49 year old female with past medical history of dyslipidemia, depression, and who presents in follow up of her biopsy proven autoimmune hepatitis.    When she was last seen in clinic we felt that she did meet a criteria for the diagnosis of autoimmune hepatitis.  We started on azathioprine 50 mg daily and she is tolerated this medication without significant side effects.  She continues on prednisone 5 mg daily.  She has had follow-up with an outpatient rheumatologist and felt that she did not have any overt medical symptoms from the perspective of a diagnosis of lupus that would warrant further treatment or increased immunosuppression at this time.    Denies overt fevers or chills, notes that she has not yet had her influenza vaccine and is requesting one today.  She denies having issues with abdominal pain, nausea or vomiting.    Past Medical History:  Past Medical History:   Diagnosis Date     Autoimmune hepatitis (H)      Dyslipidemia      Hypothyroidism        Surgical History:  Past Surgical History:   Procedure Laterality Date     LAPAROSCOPIC CHOLECYSTECTOMY  03/30/2017     LIVER BIOPSY  06/24/2019    [facility]     TUBAL LIGATION         Social History:  Social History     Tobacco Use     Smoking status: Former Smoker     Types: Other     Smokeless tobacco: Never Used     Tobacco comment: Former vaper quit 02/2018   Substance Use Topics     Alcohol use: Yes     Frequency: Monthly or less     Comment: rare     Drug use: Never        Family History:  Family History   Problem Relation Age of Onset     Colon Cancer No family hx of      Autoimmune Disease No family hx of        Medications:  Current Outpatient Medications   Medication     azaTHIOprine (IMURAN) 50 MG tablet     Cholecalciferol (VITAMIN D3) 25 MCG (1000  "UT) CHEW     fenofibrate (TRICOR) 145 MG tablet     ferrous sulfate (FEROSUL) 325 (65 Fe) MG tablet     FLUoxetine (PROZAC) 20 MG capsule     levothyroxine (SYNTHROID/LEVOTHROID) 50 MCG tablet     predniSONE (DELTASONE) 1 MG tablet     predniSONE (DELTASONE) 5 MG tablet     predniSONE (DELTASONE) 5 MG tablet     No current facility-administered medications for this visit.        Review of Systems  A complete 10 point review of systems was asked and answered in the negative unless specifically commented upon in the HPI    Objective:         Vitals:    01/13/20 1332   BP: 127/79   Pulse: 71   Temp: 97.8  F (36.6  C)   TempSrc: Oral   SpO2: 96%   Weight: 77.5 kg (170 lb 12.8 oz)   Height: 1.626 m (5' 4\")     Body mass index is 29.32 kg/m .     Physical Exam    Vitals reviewed.   Constitutional: Well-developed, well-nourished, in no apparent distress.    HEENT: Normocephalic. No scleral icterus.  Dentition normal  Neck/Lymph: Normal ROM, supple.  Cardiac:  Regular rate and rhythm.    Respiratory: Normal respiratory excursion   GI:  Abdomen soft, non-distended, non-tender. BS present.   Skin:  Skin is warm and dry. No rash noted.    Peripheral Vascular: no lower extremity edema.   Musculoskeletal:  ROM intact, normal muscle bulk    Psychiatric: Normal mood and affect. Behavior is normal.  Neuro:  no asterixis, no tremor    Labs and Diagnostic tests:  Results for JUAN GILL (MRN [id number]) as of 1/13/2020 17:35   Ref. Range 11/4/2019 00:00 1/13/2020 11:51   Albumin Latest Ref Range: 3.4 - 5.0 g/dL 3.4 3.5   Protein Total Latest Ref Range: 6.8 - 8.8 g/dL 7.4 7.4   Bilirubin Total Latest Ref Range: 0.2 - 1.3 mg/dL 0.2 0.3   Alkaline Phosphatase Latest Ref Range: 40 - 150 U/L 63 74   ALT Latest Ref Range: 0 - 50 U/L 32 28   AST Latest Ref Range: 0 - 45 U/L 21 15         Imaging:  Abd US 4/2019  liver has no overt evidence of hepatic steatosis.  No liver mass was visualized.  Liver measures 12.5 cm in craniocaudal " dimension.  Patient is undergone a cholecystectomy.  The common bile duct measures 6.5 mm.  Pancreas appears unremarkable.    Procedures:  Liver biopsy: June 24, 2019.    sections show liver needle biopsy tissue measuring 2.4 cm containing approximately 15 portal tracts.  Some of the portal tracts show increased chronic inflammatory cells including numerous plasma cells and some small lymphocytes and occasional eosinophils.  The bile ducts are normal.  There is minimal interface hepatitis.  Less than 15% macrovesicular steatosis is present.  There is no ballooning degeneration.  Lobular inflammation is minimal.  There is no bridget formation, there is no cholestasis, there is no fibrosis.      Assessment and Plan:    Autoimmune hepatitis:    - Based on labs and liver biopsy,she does meet diagnostic criteria for a diagnosis of autoimmune hepatitis  -She is responded incredibly well to the initial steroid burst and now on a controller medication with azathioprine 50 mg daily  -Her ALT and AST are well within normal limits.  As per my practice, I do check serum IgG levels with each liver test as a surrogate for immune activity; pending for today's visit  -Discussed with the patient practice that we will attempt to wean her off of all immunosuppression at sometime in the future, however, we do like to see a prolonged consolidation phase with a decrease in overall immune activity to give the patient the best chance of having quiescent disease  -We will continue to follow labs every 2 to 3 months to assess for disease activity    Immunosuppression:  -She presents today on azathioprine 50 mg daily and prednisone 5 mg daily  -She has a TPMT = 27  -I will plan to review her liver tests and autoimmune markers and come up with a plan for further treatment of her autoimmune hepatitis  -Based on her normalized liver tests we will plan to wean the patient off of prednisone.  She is been on for the better part of 1 year we will  plan for slow taper, starting her on 4 mg daily with planned 1 mg taper every 2 to 3 months until she is discontinued the medication  -Patient was informed of potential side effects of prednisone including but not limited to: Diabetes, weight gain, osteoporosis, cataracts  -Patient was informed of potential side effects of azathioprine including but not limited to: Fevers, pancreatitis, bone marrow suppression, risk of lymphoma or leukemia.  Also discussed risk of birth effects, but patient status post surgical contraception  -We will continue to follow labs every 2 to 3 months, including labs to assess for toxicity of her immunosuppression    Routine Health Care in Patient with Chronic Liver Disease:  - We recommend screening for hepatitis A and B, please vaccinate if not immune  - All patients with liver disease, particularly those with cholestatic liver disease, are at an increased risk for osteoporosis.  We strongly recommend screening for Vitamin D deficiency at least twice yearly with aggressive supplementation/replacement as indicated.    - We also recommend a screening DEXA scan to evaluate for osteoporosis.  If present, should treat with calcium, Vitamin D supplementation, and recommend consideration of bisphosphonate therapy.  Also recommend follow up DEXA scans to evaluate for improvement of bone density on therapy.  - All patients with liver disease should avoid the use of Non-steroidal Anti-Inflammatory (NSAID) medications as they can cause significant injury to the kidneys in this population    Follow Up:  12 months     Thank you very much for the opportunity to participate in the care of this patient.  If you have any further questions, please don't hesitate to contact our office.    Thomas M. Leventhal, M.D.   of Medicine  Advanced & Transplant Hepatology  [facility]

## 2020-01-14 LAB — IGG SERPL-MCNC: 1574 MG/DL (ref 610–1616)

## 2020-02-25 ENCOUNTER — TRANSFERRED RECORDS (OUTPATIENT)
Dept: HEALTH INFORMATION MANAGEMENT | Facility: CLINIC | Age: 50
End: 2020-02-25

## 2020-02-27 ENCOUNTER — EXTERNAL ORDER RESULTS (OUTPATIENT)
Dept: GASTROENTEROLOGY | Facility: CLINIC | Age: 50
End: 2020-02-27

## 2020-02-27 LAB
ALBUMIN SERPL-MCNC: 3.4 G/DL
ALP SERPL-CCNC: 69 U/L
ALT SERPL-CCNC: 28 U/L
AST SERPL-CCNC: 28 U/L
BILIRUB SERPL-MCNC: 0.3 MG/DL
BILIRUBIN DIRECT: 0 MG/DL
IGG: 1571 MG/DL (ref 617–1344)
PROT SERPL-MCNC: 7.4 G/DL

## 2020-03-23 ENCOUNTER — TELEPHONE (OUTPATIENT)
Dept: GASTROENTEROLOGY | Facility: CLINIC | Age: 50
End: 2020-03-23

## 2020-03-23 NOTE — TELEPHONE ENCOUNTER
Left VM for patient to call clinic back with name of clinic in [place], [place] were she would like order faxed, number given.    Krissy Reynoso LPN  Hepatology Clinic    ----- Message from Thomas Michael Leventhal, MD sent at 3/20/2020  4:41 PM CDT -----  Reviewed - called her and was going to decrease prednisone but says she is itchy    Lets get repeat hepatic panel at her clinic in [place], [place] next week and we'll decide from there    TL

## 2020-03-23 NOTE — TELEPHONE ENCOUNTER
Hepatic panel order faxed to requested clinic.    Krissy Reynoso LPN  Hepatology Clinic    --------   Health Call Center    Phone Message    May a detailed message be left on voicemail: yes     Reason for Call: Other: Patient calling back with the clinic name is [facility] in [place] their number is [phone]. Thank you.     Action Taken: Message routed to:  [facility] hep    Travel Screening: Not Applicable

## 2020-03-24 ENCOUNTER — TRANSFERRED RECORDS (OUTPATIENT)
Dept: HEALTH INFORMATION MANAGEMENT | Facility: CLINIC | Age: 50
End: 2020-03-24

## 2020-03-25 ENCOUNTER — EXTERNAL ORDER RESULTS (OUTPATIENT)
Dept: GASTROENTEROLOGY | Facility: CLINIC | Age: 50
End: 2020-03-25

## 2020-03-25 LAB
ALBUMIN SERPL-MCNC: 3.2 G/DL
ALP SERPL-CCNC: 78 U/L
ALT SERPL-CCNC: 19 U/L
AST SERPL-CCNC: 23 U/L
BILIRUB SERPL-MCNC: 0.4 MG/DL
BILIRUBIN DIRECT: 0 MG/DL
PROT SERPL-MCNC: 7.4 G/DL

## 2020-07-13 ENCOUNTER — TRANSFERRED RECORDS (OUTPATIENT)
Dept: HEALTH INFORMATION MANAGEMENT | Facility: CLINIC | Age: 50
End: 2020-07-13

## 2020-07-14 ENCOUNTER — TELEPHONE (OUTPATIENT)
Dept: GASTROENTEROLOGY | Facility: CLINIC | Age: 50
End: 2020-07-14

## 2020-07-14 NOTE — TELEPHONE ENCOUNTER
[facility] Call Center    Phone Message    May a detailed message be left on voicemail: yes     Reason for Call: Symptoms or Concerns     If patient has red-flag symptoms, warm transfer to triage line    Current symptom or concern: weak and tired     Symptoms have been present for:  2 week(s)    Has patient previously been seen for this? Yes    By : Dr.Leventhal    Date: 01/13/2020    Are there any new or worsening symptoms? Yes: she stated she wakes up tired and is just weak did labs on 07/13/2020      Action Taken: Message routed to:  [facility] hep    Travel Screening: Not Applicable

## 2020-07-16 NOTE — TELEPHONE ENCOUNTER
Called patient    - For past month having low energy, weakness, fatigue.  - She had decreased her prednisone from 4mg daily to 3 mg daily over the past month    - Increased her prednisone to 5mg daily 1 week ago.    - Reviewed labs from 7/13 - noted rise in ALT/AST compared to previous    PLAN:  - Continue on AZA 50mg daily and pred 5mg daily  - Repeat LFTs in 1 month  - Will decide then if need to increase AZA dose    Thomas M. Leventhal, M.D.   of Medicine  Advanced & Transplant Hepatology  [facility]  July 16, 2020 3:52 PM

## 2020-08-03 ENCOUNTER — TRANSFERRED RECORDS (OUTPATIENT)
Dept: HEALTH INFORMATION MANAGEMENT | Facility: CLINIC | Age: 50
End: 2020-08-03

## 2020-08-06 ENCOUNTER — TELEPHONE (OUTPATIENT)
Dept: GASTROENTEROLOGY | Facility: CLINIC | Age: 50
End: 2020-08-06

## 2020-08-06 NOTE — TELEPHONE ENCOUNTER
Connected with patient as requested by Dr. Leventhal letting pt know last labs drawn were hemolyzed and need to be redrawn. Pt will make appointment next Monday.    Krissy Reynoso LPN  Hepatology Clinic

## 2020-08-07 ENCOUNTER — TRANSFERRED RECORDS (OUTPATIENT)
Dept: HEALTH INFORMATION MANAGEMENT | Facility: CLINIC | Age: 50
End: 2020-08-07

## 2020-08-11 DIAGNOSIS — K75.4 AUTOIMMUNE HEPATITIS (H): ICD-10-CM

## 2020-08-11 RX ORDER — PREDNISONE 1 MG/1
5 TABLET ORAL DAILY
Qty: 150 TABLET | Refills: 11 | Status: SHIPPED | OUTPATIENT
Start: 2020-08-11 | End: 2021-01-11

## 2020-08-11 RX ORDER — AZATHIOPRINE 50 MG/1
75 TABLET ORAL DAILY
Qty: 45 TABLET | Refills: 11 | Status: SHIPPED | OUTPATIENT
Start: 2020-08-11 | End: 2021-01-11

## 2020-08-11 NOTE — PROGRESS NOTES
Reviewed updated labs  August 7, 2020: AST 61, ALT 25, total bilirubin 0.5, alkaline phosphatase 68, serum IgG 1410    Labs July 13: AST 47, ALT 35, total bilirubin 0.3, alkaline phosphatase 76    The patient did note that she decreased her prednisone dosing from 5 mg back down to 4 prior to her lab check on August 7  -She continues to have symptoms of significant fatigue and intermittent joint aches    Based on her slow but gradual rise of her serum liver tests from their lisa I do believe that she warrants more immunosuppression for minimally uncontrolled autoimmune hepatitis    Plan:  -We will increase azathioprine to 75 mg daily (ordered)  -We will have the patient return and remain on prednisone 5 mg daily (ordered)  -We will repeat liver tests in 1 month to assess for ability to wean down on prednisone    Patient verbalized understanding of plan    Thomas M. Leventhal, M.D.   of Medicine  Advanced & Transplant Hepatology  [facility]

## 2020-08-11 NOTE — Clinical Note
Spoke to her and she is aware of plan  - Could you please set her up for repeat hepatic panel and IgG in 1 month?  Thanks!  TL

## 2020-09-15 ENCOUNTER — TRANSFERRED RECORDS (OUTPATIENT)
Dept: HEALTH INFORMATION MANAGEMENT | Facility: CLINIC | Age: 50
End: 2020-09-15

## 2020-09-16 ENCOUNTER — TELEPHONE (OUTPATIENT)
Dept: GASTROENTEROLOGY | Facility: CLINIC | Age: 50
End: 2020-09-16

## 2020-09-16 NOTE — TELEPHONE ENCOUNTER
Called [facility] fax number given for hepatology clinic.    Krissy Reynoso LPN  Hepatology Clinic    ------  [facility] Call Center    Phone Message    May a detailed message be left on voicemail: yes     Reason for Call: Other: Crista calling to request that the clinic call the [facility] in [place] to give them the fax number for the clinic so they can fax over her lab results.  The phone number for [facility] is [phone].  Please call Crista with any questions or concerns    Action Taken: Message routed to:  [facility] JAMES Hep    Travel Screening: Not Applicable

## 2020-09-17 ENCOUNTER — TELEPHONE (OUTPATIENT)
Dept: GASTROENTEROLOGY | Facility: CLINIC | Age: 50
End: 2020-09-17

## 2020-09-17 NOTE — TELEPHONE ENCOUNTER
LVM with below information per Dr.Leventhal. Call back number given if needed.    Krissy Reynoso LPN  Hepatology Clinic    -------  Her ALT and AST are within normal limits but the IgG was still elevated    - She should continue on her current immunosuppression dose and repeat labs in 3 months.    Thanks!

## 2021-01-05 DIAGNOSIS — K75.4 AUTOIMMUNE HEPATITIS (H): Primary | ICD-10-CM

## 2021-01-11 ENCOUNTER — VIRTUAL VISIT (OUTPATIENT)
Dept: GASTROENTEROLOGY | Facility: CLINIC | Age: 51
End: 2021-01-11
Attending: INTERNAL MEDICINE
Payer: COMMERCIAL

## 2021-01-11 ENCOUNTER — TRANSFERRED RECORDS (OUTPATIENT)
Dept: HEALTH INFORMATION MANAGEMENT | Facility: CLINIC | Age: 51
End: 2021-01-11

## 2021-01-11 DIAGNOSIS — K75.4 AUTOIMMUNE HEPATITIS (H): Primary | ICD-10-CM

## 2021-01-11 DIAGNOSIS — D84.9 IMMUNOSUPPRESSION (H): ICD-10-CM

## 2021-01-11 PROCEDURE — 99213 OFFICE O/P EST LOW 20 MIN: CPT | Mod: 95 | Performed by: INTERNAL MEDICINE

## 2021-01-11 RX ORDER — PREDNISONE 5 MG/1
5 TABLET ORAL DAILY
Qty: 30 TABLET | Refills: 11 | Status: SHIPPED | OUTPATIENT
Start: 2021-01-11 | End: 2021-01-12

## 2021-01-11 RX ORDER — MULTIPLE VITAMINS W/ MINERALS TAB 9MG-400MCG
1 TAB ORAL DAILY
COMMUNITY

## 2021-01-11 RX ORDER — LANOLIN ALCOHOL/MO/W.PET/CERES
1000 CREAM (GRAM) TOPICAL DAILY
COMMUNITY

## 2021-01-11 RX ORDER — PREDNISONE 1 MG/1
5 TABLET ORAL DAILY
Qty: 150 TABLET | Refills: 11 | Status: SHIPPED | OUTPATIENT
Start: 2021-01-11 | End: 2021-01-12

## 2021-01-11 RX ORDER — AZATHIOPRINE 50 MG/1
75 TABLET ORAL DAILY
Qty: 45 TABLET | Refills: 11 | Status: SHIPPED | OUTPATIENT
Start: 2021-01-11 | End: 2022-02-04

## 2021-01-11 ASSESSMENT — PAIN SCALES - GENERAL: PAINLEVEL: NO PAIN (0)

## 2021-01-11 NOTE — PATIENT INSTRUCTIONS
- Please get labs checked in early APRIL, JULY, OCTOBER, AND NEXT JANUARY    - I will be in touch regarding changes to prednisone after I see your labs

## 2021-01-11 NOTE — PROGRESS NOTES
Crista is a 50 year old who is being evaluated via a billable telephone visit.      What phone number would you like to be contacted at? [phone]  How would you like to obtain your AVS? Mail a copy      Date of Service: January 11, 2021     Subjective:            Crista Cosme is a 50 year old female presenting for follow up of liver disease    History of Present Illness   Crista Cosme is a 50 year old female with past medical history of dyslipidemia, depression, and who presents in follow up of her biopsy proven autoimmune hepatitis.     Since last seen reports doing well.  First labs in several months checked this AM.  Reports was on a 2 week quarantine after a positive COVID test (2 subsequent negatives) and she was asymptomatic.    Reports that she is currently on a regimen of azathioprine 75mg daily and prednisone 5mg daily.    Denies issues with abdominal pain, fevers, chills, nausea, emesis.    Past Medical History:  Past Medical History:   Diagnosis Date     Autoimmune hepatitis (H)      Dyslipidemia      Hypothyroidism        Surgical History:  Past Surgical History:   Procedure Laterality Date     LAPAROSCOPIC CHOLECYSTECTOMY  03/30/2017     LIVER BIOPSY  06/24/2019    [facility]     TUBAL LIGATION         Social History:  Social History     Tobacco Use     Smoking status: Former Smoker     Types: Other     Smokeless tobacco: Never Used     Tobacco comment: Former vaper quit 02/2018   Substance Use Topics     Alcohol use: Yes     Frequency: Monthly or less     Comment: rare     Drug use: Never        Family History:  Family History   Problem Relation Age of Onset     Colon Cancer No family hx of      Autoimmune Disease No family hx of        Medications:  Current Outpatient Medications   Medication     azaTHIOprine (IMURAN) 50 MG tablet     Cholecalciferol (VITAMIN D3) 25 MCG (1000 UT) CHEW     cyanocobalamin (VITAMIN B-12) 1000 MCG tablet     ferrous sulfate (FEROSUL) 325 (65 Fe) MG tablet      FLUoxetine (PROZAC) 20 MG capsule     levothyroxine (SYNTHROID/LEVOTHROID) 50 MCG tablet     multivitamin w/minerals (MULTI-VITAMIN) tablet     predniSONE (DELTASONE) 5 MG tablet     fenofibrate (TRICOR) 145 MG tablet     predniSONE (DELTASONE) 1 MG tablet     predniSONE (DELTASONE) 5 MG tablet     No current facility-administered medications for this visit.        Review of Systems  A complete 10 point review of systems was asked and answered in the negative unless specifically commented upon in the HPI    Objective:         There were no vitals filed for this visit.  There is no height or weight on file to calculate BMI.     Physical Exam    Labs and Diagnostic tests:  Pending up to date labs        Imaging:  Abd US 4/2019  liver has no overt evidence of hepatic steatosis.  No liver mass was visualized.  Liver measures 12.5 cm in craniocaudal dimension.  Patient is undergone a cholecystectomy.  The common bile duct measures 6.5 mm.  Pancreas appears unremarkable.    Procedures:  Liver biopsy: June 24, 2019.    sections show liver needle biopsy tissue measuring 2.4 cm containing approximately 15 portal tracts.  Some of the portal tracts show increased chronic inflammatory cells including numerous plasma cells and some small lymphocytes and occasional eosinophils.  The bile ducts are normal.  There is minimal interface hepatitis.  Less than 15% macrovesicular steatosis is present.  There is no ballooning degeneration.  Lobular inflammation is minimal.  There is no bridget formation, there is no cholestasis, there is no fibrosis.      Assessment and Plan:    Autoimmune hepatitis:    - Based on labs and liver biopsy,she does meet diagnostic criteria for a diagnosis of autoimmune hepatitis  -She is responded incredibly well to the initial steroid burst initially, but has been difficult to wean off prednisone  - Immunosuppression per below  - Will continue with labs every 2-3 months locally to make changes and evaluate  for side effects    Immunosuppression:  -She presents today on azathioprine 75 mg daily and prednisone 5 mg daily  -She has a TPMT = 27  - Awaiting today's labs prior to weaning down on prednisone    Side Effects of Immunosuppression:  -Patient was informed of potential side effects of prednisone including but not limited to: Diabetes, weight gain, osteoporosis, cataracts  -Patient was informed of potential side effects of azathioprine including but not limited to: Fevers, pancreatitis, bone marrow suppression, risk of lymphoma or leukemia.  Also discussed risk of birth effects, but patient status post surgical contraception  -We will continue to follow labs every 2 to 3 months, including labs to assess for toxicity of her immunosuppression    Routine Health Care in Patient with Chronic Liver Disease:  - We recommend screening for hepatitis A and B, please vaccinate if not immune  - All patients with liver disease, particularly those with cholestatic liver disease, are at an increased risk for osteoporosis.  We strongly recommend screening for Vitamin D deficiency at least twice yearly with aggressive supplementation/replacement as indicated.    - We also recommend a screening DEXA scan to evaluate for osteoporosis.  If present, should treat with calcium, Vitamin D supplementation, and recommend consideration of bisphosphonate therapy.  Also recommend follow up DEXA scans to evaluate for improvement of bone density on therapy.  - All patients with liver disease should avoid the use of Non-steroidal Anti-Inflammatory (NSAID) medications as they can cause significant injury to the kidneys in this population    Follow Up:  6  months     Thank you very much for the opportunity to participate in the care of this patient.  If you have any further questions, please don't hesitate to contact our office.    Thomas M. Leventhal, M.D.   of Medicine  Advanced & Transplant Hepatology  [facility]  [facility]      Phone call duration: 14 minutes

## 2021-01-11 NOTE — LETTER
1/11/2021         RE: Crista Cosme  [address]  [facility] [address]        Dear Colleague,    Thank you for referring your patient, Crista Cosme, to the [facility] [place]. Please see a copy of my visit note below.    Crista is a 50 year old who is being evaluated via a billable telephone visit.      What phone number would you like to be contacted at? [phone]  How would you like to obtain your AVS? Mail a copy      Date of Service: January 11, 2021     Subjective:            Crista Cosme is a 50 year old female presenting for follow up of liver disease    History of Present Illness   Crista Cosme is a 50 year old female with past medical history of dyslipidemia, depression, and who presents in follow up of her biopsy proven autoimmune hepatitis.     Since last seen reports doing well.  First labs in several months checked this AM.  Reports was on a 2 week quarantine after a positive COVID test (2 subsequent negatives) and she was asymptomatic.    Reports that she is currently on a regimen of azathioprine 75mg daily and prednisone 5mg daily.    Denies issues with abdominal pain, fevers, chills, nausea, emesis.    Past Medical History:  Past Medical History:   Diagnosis Date     Autoimmune hepatitis (H)      Dyslipidemia      Hypothyroidism        Surgical History:  Past Surgical History:   Procedure Laterality Date     LAPAROSCOPIC CHOLECYSTECTOMY  03/30/2017     LIVER BIOPSY  06/24/2019    [facility]     TUBAL LIGATION         Social History:  Social History     Tobacco Use     Smoking status: Former Smoker     Types: Other     Smokeless tobacco: Never Used     Tobacco comment: Former vaper quit 02/2018   Substance Use Topics     Alcohol use: Yes     Frequency: Monthly or less     Comment: rare     Drug use: Never        Family History:  Family History   Problem Relation Age of Onset     Colon Cancer No family hx of      Autoimmune Disease No family hx of         Medications:  Current Outpatient Medications   Medication     azaTHIOprine (IMURAN) 50 MG tablet     Cholecalciferol (VITAMIN D3) 25 MCG (1000 UT) CHEW     cyanocobalamin (VITAMIN B-12) 1000 MCG tablet     ferrous sulfate (FEROSUL) 325 (65 Fe) MG tablet     FLUoxetine (PROZAC) 20 MG capsule     levothyroxine (SYNTHROID/LEVOTHROID) 50 MCG tablet     multivitamin w/minerals (MULTI-VITAMIN) tablet     predniSONE (DELTASONE) 5 MG tablet     fenofibrate (TRICOR) 145 MG tablet     predniSONE (DELTASONE) 1 MG tablet     predniSONE (DELTASONE) 5 MG tablet     No current facility-administered medications for this visit.        Review of Systems  A complete 10 point review of systems was asked and answered in the negative unless specifically commented upon in the HPI    Objective:         There were no vitals filed for this visit.  There is no height or weight on file to calculate BMI.     Physical Exam    Labs and Diagnostic tests:  Pending up to date labs        Imaging:  Abd US 4/2019  liver has no overt evidence of hepatic steatosis.  No liver mass was visualized.  Liver measures 12.5 cm in craniocaudal dimension.  Patient is undergone a cholecystectomy.  The common bile duct measures 6.5 mm.  Pancreas appears unremarkable.    Procedures:  Liver biopsy: June 24, 2019.    sections show liver needle biopsy tissue measuring 2.4 cm containing approximately 15 portal tracts.  Some of the portal tracts show increased chronic inflammatory cells including numerous plasma cells and some small lymphocytes and occasional eosinophils.  The bile ducts are normal.  There is minimal interface hepatitis.  Less than 15% macrovesicular steatosis is present.  There is no ballooning degeneration.  Lobular inflammation is minimal.  There is no bridget formation, there is no cholestasis, there is no fibrosis.      Assessment and Plan:    Autoimmune hepatitis:    - Based on labs and liver biopsy,she does meet diagnostic criteria for a  diagnosis of autoimmune hepatitis  -She is responded incredibly well to the initial steroid burst initially, but has been difficult to wean off prednisone  - Immunosuppression per below  - Will continue with labs every 2-3 months locally to make changes and evaluate for side effects    Immunosuppression:  -She presents today on azathioprine 75 mg daily and prednisone 5 mg daily  -She has a TPMT = 27  - Awaiting today's labs prior to weaning down on prednisone    Side Effects of Immunosuppression:  -Patient was informed of potential side effects of prednisone including but not limited to: Diabetes, weight gain, osteoporosis, cataracts  -Patient was informed of potential side effects of azathioprine including but not limited to: Fevers, pancreatitis, bone marrow suppression, risk of lymphoma or leukemia.  Also discussed risk of birth effects, but patient status post surgical contraception  -We will continue to follow labs every 2 to 3 months, including labs to assess for toxicity of her immunosuppression    Routine Health Care in Patient with Chronic Liver Disease:  - We recommend screening for hepatitis A and B, please vaccinate if not immune  - All patients with liver disease, particularly those with cholestatic liver disease, are at an increased risk for osteoporosis.  We strongly recommend screening for Vitamin D deficiency at least twice yearly with aggressive supplementation/replacement as indicated.    - We also recommend a screening DEXA scan to evaluate for osteoporosis.  If present, should treat with calcium, Vitamin D supplementation, and recommend consideration of bisphosphonate therapy.  Also recommend follow up DEXA scans to evaluate for improvement of bone density on therapy.  - All patients with liver disease should avoid the use of Non-steroidal Anti-Inflammatory (NSAID) medications as they can cause significant injury to the kidneys in this population    Follow Up:  6  months     Thank you very much  for the opportunity to participate in the care of this patient.  If you have any further questions, please don't hesitate to contact our office.    Thomas M. Leventhal, M.D.   of Medicine  Advanced & Transplant Hepatology  [facility]      Phone call duration: 14 minutes        Again, thank you for allowing me to participate in the care of your patient.        Sincerely,        Thomas M. Leventhal, MD

## 2021-01-12 DIAGNOSIS — K75.4 AUTOIMMUNE HEPATITIS (H): ICD-10-CM

## 2021-01-12 RX ORDER — PREDNISONE 5 MG/1
5 TABLET ORAL DAILY
Qty: 30 TABLET | Refills: 11 | Status: SHIPPED | OUTPATIENT
Start: 2021-01-12 | End: 2021-01-15

## 2021-01-15 ENCOUNTER — DOCUMENTATION ONLY (OUTPATIENT)
Dept: GASTROENTEROLOGY | Facility: CLINIC | Age: 51
End: 2021-01-15

## 2021-01-15 DIAGNOSIS — K75.4 AUTOIMMUNE HEPATITIS (H): ICD-10-CM

## 2021-01-15 RX ORDER — PREDNISONE 1 MG/1
2 TABLET ORAL DAILY
COMMUNITY
Start: 2021-01-15 | End: 2022-01-21

## 2021-01-15 NOTE — PROGRESS NOTES
Reviewed outside labs from earlier this week    Noted that all liver tests are well within normal limits.    PLAN:  - will have patient decrease her daily prednisone dose to 4mg daily  - repeat liver tests in 2-3 months    Thomas M. Leventhal, M.D.   of Medicine  Advanced/Transplant Hepatology & Critical Care Medicine  The [facility]

## 2021-01-15 NOTE — PROGRESS NOTES
Connected with patient. Went over new orders per Dr. Leventhal. Pt to decrease daily prednisone dose to 4 mg daily and repeat liver tests in 2-3 months. Pt voiced understanding of plan of care.    Krissy FLOREZ LPN  Hepatology Clinic

## 2021-04-27 ENCOUNTER — TRANSFERRED RECORDS (OUTPATIENT)
Dept: HEALTH INFORMATION MANAGEMENT | Facility: CLINIC | Age: 51
End: 2021-04-27

## 2021-04-27 LAB
CREAT SERPL-MCNC: 0.7 MG/DL (ref 0.4–1)
GFR SERPL CREATININE-BSD FRML MDRD: 88.6 ML/MIN (ref 60–150)
GLUCOSE SERPL-MCNC: 95 MG/DL (ref 70–99)
POTASSIUM SERPL-SCNC: 4 MMOL/L (ref 3.4–5.1)

## 2021-05-04 ENCOUNTER — DOCUMENTATION ONLY (OUTPATIENT)
Dept: GASTROENTEROLOGY | Facility: CLINIC | Age: 51
End: 2021-05-04

## 2021-05-04 DIAGNOSIS — K75.4 AUTOIMMUNE HEPATITIS (H): Primary | ICD-10-CM

## 2021-05-04 DIAGNOSIS — D84.9 IMMUNOSUPPRESSION (H): ICD-10-CM

## 2021-05-04 RX ORDER — PREDNISONE 1 MG/1
4 TABLET ORAL DAILY
Qty: 120 TABLET | Refills: 11 | Status: SHIPPED | OUTPATIENT
Start: 2021-05-04 | End: 2023-03-06

## 2021-05-04 NOTE — PROGRESS NOTES
left for patient with new orders per Dr. Leventhal as stated below. Asked that pt call clinic back if needed with questions, number given.    Krissy FLOREZ LPN  Hepatology Clinic      ----------  Leventhal, Thomas Michael, MD  [facility]     Could you please let Crista know I got her labs - her liver tests are perfectly normal, and I want to wean down on the prednisone.  We will decrease to 4mg daily and repeat liver tests in 3 months ([facility]

## 2021-05-04 NOTE — PROGRESS NOTES
Labs from 4/27/2021  Albumin 3.7, T Bili 0.2, AST 15, ALT 12, Alk Phos 74, IgG 1430  Na 133, K 4, Cr 0.7  WBC 8.9, Hgb 10.7, Plts 474    Has been on Pred 5mg daily and AZA 75mg daily    Will wean down prednisone in 1mg increments    Thomas M. Leventhal, M.D.   of Medicine  Advanced/Transplant Hepatology & Critical Care Medicine  The [facility]

## 2021-09-22 ENCOUNTER — TRANSFERRED RECORDS (OUTPATIENT)
Dept: HEALTH INFORMATION MANAGEMENT | Facility: CLINIC | Age: 51
End: 2021-09-22

## 2021-09-24 ENCOUNTER — TELEPHONE (OUTPATIENT)
Dept: GASTROENTEROLOGY | Facility: CLINIC | Age: 51
End: 2021-09-24

## 2021-09-24 NOTE — TELEPHONE ENCOUNTER
Connected with patient went over new orders per Dr. Leventhal as stated below. Pt voiced understanding of plan. Pt will call back to schedule follow-up.    Krissy FLOREZ LPN  Hepatology Clinic    ----- Message from Thomas Michael Leventhal, MD sent at 9/24/2021 11:58 AM CDT -----  I can't remember if I just sent this    Decrease pred to 2mg daily  Aza 75mg daily continue  Labs Q3 months Hepatic panel, CBC, IgG       Should have an appt with me on the books    TL

## 2022-01-21 DIAGNOSIS — K75.4 AUTOIMMUNE HEPATITIS (H): ICD-10-CM

## 2022-01-21 RX ORDER — PREDNISONE 1 MG/1
2 TABLET ORAL DAILY
Qty: 180 TABLET | Refills: 0 | Status: SHIPPED | OUTPATIENT
Start: 2022-01-21 | End: 2023-03-06

## 2022-02-04 DIAGNOSIS — K75.4 AUTOIMMUNE HEPATITIS (H): ICD-10-CM

## 2022-02-04 RX ORDER — AZATHIOPRINE 50 MG/1
75 TABLET ORAL DAILY
Qty: 45 TABLET | Refills: 11 | Status: SHIPPED | OUTPATIENT
Start: 2022-02-04 | End: 2023-03-02

## 2022-03-01 DIAGNOSIS — K75.4 AUTOIMMUNE HEPATITIS (H): Primary | ICD-10-CM

## 2022-03-07 ENCOUNTER — LAB (OUTPATIENT)
Dept: LAB | Facility: CLINIC | Age: 52
End: 2022-03-07
Attending: INTERNAL MEDICINE
Payer: COMMERCIAL

## 2022-03-07 ENCOUNTER — OFFICE VISIT (OUTPATIENT)
Dept: GASTROENTEROLOGY | Facility: CLINIC | Age: 52
End: 2022-03-07
Attending: INTERNAL MEDICINE
Payer: COMMERCIAL

## 2022-03-07 ENCOUNTER — TELEPHONE (OUTPATIENT)
Dept: GASTROENTEROLOGY | Facility: CLINIC | Age: 52
End: 2022-03-07

## 2022-03-07 VITALS
WEIGHT: 165.6 LBS | DIASTOLIC BLOOD PRESSURE: 80 MMHG | TEMPERATURE: 97.6 F | HEART RATE: 82 BPM | BODY MASS INDEX: 28.43 KG/M2 | OXYGEN SATURATION: 98 % | SYSTOLIC BLOOD PRESSURE: 124 MMHG

## 2022-03-07 DIAGNOSIS — K75.4 AUTOIMMUNE HEPATITIS (H): Primary | ICD-10-CM

## 2022-03-07 DIAGNOSIS — Z79.899 HIGH RISK MEDICATION USE: ICD-10-CM

## 2022-03-07 DIAGNOSIS — Z71.9 HEALTH EDUCATION: ICD-10-CM

## 2022-03-07 DIAGNOSIS — D84.9 IMMUNOSUPPRESSION (H): ICD-10-CM

## 2022-03-07 DIAGNOSIS — K75.4 AUTOIMMUNE HEPATITIS (H): ICD-10-CM

## 2022-03-07 LAB
ALBUMIN SERPL-MCNC: 3.7 G/DL (ref 3.4–5)
ALP SERPL-CCNC: 88 U/L (ref 40–150)
ALT SERPL W P-5'-P-CCNC: 17 U/L (ref 0–50)
ANION GAP SERPL CALCULATED.3IONS-SCNC: 10 MMOL/L (ref 3–14)
AST SERPL W P-5'-P-CCNC: 18 U/L (ref 0–45)
BILIRUB DIRECT SERPL-MCNC: <0.1 MG/DL (ref 0–0.2)
BILIRUB SERPL-MCNC: 0.4 MG/DL (ref 0.2–1.3)
BUN SERPL-MCNC: 15 MG/DL (ref 7–30)
CALCIUM SERPL-MCNC: 8.6 MG/DL (ref 8.5–10.1)
CHLORIDE BLD-SCNC: 105 MMOL/L (ref 94–109)
CO2 SERPL-SCNC: 23 MMOL/L (ref 20–32)
CREAT SERPL-MCNC: 0.67 MG/DL (ref 0.52–1.04)
ERYTHROCYTE [DISTWIDTH] IN BLOOD BY AUTOMATED COUNT: 13.9 % (ref 10–15)
GFR SERPL CREATININE-BSD FRML MDRD: >90 ML/MIN/1.73M2
GLUCOSE BLD-MCNC: 110 MG/DL (ref 70–99)
HCT VFR BLD AUTO: 39.7 % (ref 35–47)
HGB BLD-MCNC: 13.6 G/DL (ref 11.7–15.7)
IGG SERPL-MCNC: 1335 MG/DL (ref 610–1616)
INR PPP: 1.09 (ref 0.85–1.15)
MCH RBC QN AUTO: 32.6 PG (ref 26.5–33)
MCHC RBC AUTO-ENTMCNC: 34.3 G/DL (ref 31.5–36.5)
MCV RBC AUTO: 95 FL (ref 78–100)
PLATELET # BLD AUTO: 332 10E3/UL (ref 150–450)
POTASSIUM BLD-SCNC: 4.2 MMOL/L (ref 3.4–5.3)
PROT SERPL-MCNC: 7.8 G/DL (ref 6.8–8.8)
RBC # BLD AUTO: 4.17 10E6/UL (ref 3.8–5.2)
SODIUM SERPL-SCNC: 138 MMOL/L (ref 133–144)
WBC # BLD AUTO: 7.5 10E3/UL (ref 4–11)

## 2022-03-07 PROCEDURE — 82248 BILIRUBIN DIRECT: CPT | Performed by: PATHOLOGY

## 2022-03-07 PROCEDURE — 82784 ASSAY IGA/IGD/IGG/IGM EACH: CPT | Mod: 90 | Performed by: PATHOLOGY

## 2022-03-07 PROCEDURE — 85610 PROTHROMBIN TIME: CPT | Performed by: PATHOLOGY

## 2022-03-07 PROCEDURE — G0463 HOSPITAL OUTPT CLINIC VISIT: HCPCS

## 2022-03-07 PROCEDURE — 99214 OFFICE O/P EST MOD 30 MIN: CPT | Performed by: INTERNAL MEDICINE

## 2022-03-07 PROCEDURE — 85027 COMPLETE CBC AUTOMATED: CPT | Performed by: PATHOLOGY

## 2022-03-07 PROCEDURE — 80053 COMPREHEN METABOLIC PANEL: CPT | Performed by: PATHOLOGY

## 2022-03-07 PROCEDURE — 99000 SPECIMEN HANDLING OFFICE-LAB: CPT | Performed by: PATHOLOGY

## 2022-03-07 PROCEDURE — 36415 COLL VENOUS BLD VENIPUNCTURE: CPT | Performed by: PATHOLOGY

## 2022-03-07 ASSESSMENT — PAIN SCALES - GENERAL: PAINLEVEL: NO PAIN (0)

## 2022-03-07 NOTE — NURSING NOTE
Chief Complaint   Patient presents with     RECHECK       /80   Pulse 82   Temp 97.6  F (36.4  C) (Oral)   Wt 75.1 kg (165 lb 9.6 oz)   SpO2 98%   BMI 28.43 kg/m      Mckinley Wiseman MA on 3/7/2022 at 7:53 AM

## 2022-03-07 NOTE — PROGRESS NOTES
"Date of Service: March 7, 2022     Subjective:            Crista Cosme is a 51 year old female presenting for follow up of liver disease    History of Present Illness   Crista Cosme is a 51 year old female with past medical history of dyslipidemia, depression, and who presents in follow up of her biopsy proven autoimmune hepatitis.    Since last seen in clinic she reports that she had a hospitalization in September 2021 for pancreatitis.  Notes that she was in the hospital for approximately 1 week.  She states that this was related to very high triglyceride levels, reportedly greater than 1000.  There was discussion as to need for plasmapheresis, but after starting on a new fibrate medicine, her numbers dropped dramatically and her symptoms resolved.  Was not felt that this was secondary to her azathioprine use, and she is not had any further signs or symptoms of pancreatitis.    She is continue to take her azathioprine 75 mg daily.  She is currently on prednisone 1 mg daily.  She notes that she has been reticent to discontinue the medicine as when she has previously she developed \"creepy crawly's\" on her arms and legs, which she attributed to being off the medicine.    She denies issues with impaired memory or concentration, denies issues with fluid retention other than during her recent hospitalization    She has received 2 doses of the Covid vaccine    Denies current issues with abdominal pain, fevers, chills, nausea, emesis.    Past Medical History:  Past Medical History:   Diagnosis Date     Autoimmune hepatitis (H)      Dyslipidemia      Hypothyroidism        Surgical History:  Past Surgical History:   Procedure Laterality Date     LAPAROSCOPIC CHOLECYSTECTOMY  03/30/2017     LIVER BIOPSY  06/24/2019    [facility]     TUBAL LIGATION         Social History:  Social History     Tobacco Use     Smoking status: Former Smoker     Types: Other     Smokeless tobacco: Never Used     Tobacco comment: Former " jem quit 02/2018   Substance Use Topics     Alcohol use: Yes     Comment: rare     Drug use: Never        Family History:  Family History   Problem Relation Age of Onset     Colon Cancer No family hx of      Autoimmune Disease No family hx of        Medications:  Current Outpatient Medications   Medication     azaTHIOprine (IMURAN) 50 MG tablet     Cholecalciferol (VITAMIN D3) 25 MCG (1000 UT) CHEW     cyanocobalamin (VITAMIN B-12) 1000 MCG tablet     fenofibrate (TRICOR) 145 MG tablet     ferrous sulfate (FEROSUL) 325 (65 Fe) MG tablet     FLUoxetine (PROZAC) 20 MG capsule     levothyroxine (SYNTHROID/LEVOTHROID) 50 MCG tablet     multivitamin w/minerals (MULTI-VITAMIN) tablet     predniSONE (DELTASONE) 1 MG tablet     predniSONE (DELTASONE) 1 MG tablet     No current facility-administered medications for this visit.       Review of Systems  A complete 10 point review of systems was asked and answered in the negative unless specifically commented upon in the HPI    Objective:         Vitals:    03/07/22 0751   BP: 124/80   Pulse: 82   Temp: 97.6  F (36.4  C)   TempSrc: Oral   SpO2: 98%   Weight: 75.1 kg (165 lb 9.6 oz)     Body mass index is 28.43 kg/m .     Physical Exam  Gen: NAD  HEENT: anicteric  Pulm: Normal respiratory excursion   Abd: soft, non-tender  Ext: no edema  MSK: normal muscle bulk    Labs and Diagnostic tests:  reviewed        Imaging:  Abd US 4/2019  liver has no overt evidence of hepatic steatosis.  No liver mass was visualized.  Liver measures 12.5 cm in craniocaudal dimension.  Patient is undergone a cholecystectomy.  The common bile duct measures 6.5 mm.  Pancreas appears unremarkable.    Procedures:  Liver biopsy: June 24, 2019.    sections show liver needle biopsy tissue measuring 2.4 cm containing approximately 15 portal tracts.  Some of the portal tracts show increased chronic inflammatory cells including numerous plasma cells and some small lymphocytes and occasional eosinophils.  The  bile ducts are normal.  There is minimal interface hepatitis.  Less than 15% macrovesicular steatosis is present.  There is no ballooning degeneration.  Lobular inflammation is minimal.  There is no bridget formation, there is no cholestasis, there is no fibrosis.      Assessment and Plan:    Autoimmune hepatitis:    - Based on labs and liver biopsy,she does meet diagnostic criteria for a diagnosis of autoimmune hepatitis  - Pt with excellent allograft function at this time  -She responded well to the initial steroid burst initially, and is now on 1mg of prednisone daily  - Immunosuppression per below  - Will continue with labs every 3-6 months locally to make changes and evaluate for side effects    Immunosuppression:  -She presents today on azathioprine 75 mg daily and prednisone 1 mg daily  -She has a TPMT = 27  - We will stop the prednisone 1mg daily given normal liver tests  - Discussed long term AZA plan; hopes to wean to 50mg daily if liver tests remain stable    Side Effects of Immunosuppression:  -Patient was informed of potential side effects of prednisone including but not limited to: Diabetes, weight gain, osteoporosis, cataracts  -Patient was informed of potential side effects of azathioprine including but not limited to: Fevers, pancreatitis, bone marrow suppression, risk of lymphoma or leukemia.    -We will continue to follow labs every 3-6 months, including labs to assess for toxicity of her immunosuppression    Routine Health Care in Patient with Chronic Liver Disease:  - We recommend screening for hepatitis A and B, please vaccinate if not immune  - All patients with liver disease, particularly those with cholestatic liver disease, are at an increased risk for osteoporosis.  We strongly recommend screening for Vitamin D deficiency at least twice yearly with aggressive supplementation/replacement as indicated.    - We also recommend a screening DEXA scan to evaluate for osteoporosis.  If present,  should treat with calcium, Vitamin D supplementation, and recommend consideration of bisphosphonate therapy.  Also recommend follow up DEXA scans to evaluate for improvement of bone density on therapy.  - All patients with liver disease should avoid the use of Non-steroidal Anti-Inflammatory (NSAID) medications as they can cause significant injury to the kidneys in this population    Follow Up:  12  months     Thank you very much for the opportunity to participate in the care of this patient.  If you have any further questions, please don't hesitate to contact our office.    Thomas M. Leventhal, M.D.   of Medicine  Advanced & Transplant Hepatology  [facility]

## 2022-03-07 NOTE — PATIENT INSTRUCTIONS
- Please continue to take the azathioprine 75mg every day    - lease STOP taking the prednisone    - Please get labs checked again in September 2022 and again in early March 2023

## 2022-03-07 NOTE — TELEPHONE ENCOUNTER
Standing labs faxed to [facility].    Krissy FLOREZ LPN  Hepatology Clinic      -----------------  Leventhal, Thomas Michael, MD Beckenbach, Shannon, LPN  Could I trouble you to fax the following to her clinic: [facility] in [place], [place]     Hepatic Panel, serum IgG, CBC   Standing order: Q6 months     - Check in September 2022 and March 2023

## 2022-03-07 NOTE — Clinical Note
Could I trouble you to fax the following to her clinic: [facility] in [place], [place]    Hepatic Panel, serum IgG, CBC  Standing order: Q6 months    - Check in September 2022 and March 2023    Thanks

## 2022-03-07 NOTE — LETTER
"  3/7/2022     RE: Crista Amador  [address]  [facility] [address]    Dear Colleague,    Thank you for referring your patient, Crista Amador, to the [facility]PAT[facility] [place]. Please see a copy of my visit note below.    Date of Service: March 7, 2022     Subjective:            Crista Cosme is a 51 year old female presenting for follow up of liver disease    History of Present Illness   Crista Cosme is a 51 year old female with past medical history of dyslipidemia, depression, and who presents in follow up of her biopsy proven autoimmune hepatitis.    Since last seen in clinic she reports that she had a hospitalization in September 2021 for pancreatitis.  Notes that she was in the hospital for approximately 1 week.  She states that this was related to very high triglyceride levels, reportedly greater than 1000.  There was discussion as to need for plasmapheresis, but after starting on a new fibrate medicine, her numbers dropped dramatically and her symptoms resolved.  Was not felt that this was secondary to her azathioprine use, and she is not had any further signs or symptoms of pancreatitis.    She is continue to take her azathioprine 75 mg daily.  She is currently on prednisone 1 mg daily.  She notes that she has been reticent to discontinue the medicine as when she has previously she developed \"creepy crawly's\" on her arms and legs, which she attributed to being off the medicine.    She denies issues with impaired memory or concentration, denies issues with fluid retention other than during her recent hospitalization    She has received 2 doses of the Covid vaccine    Denies current issues with abdominal pain, fevers, chills, nausea, emesis.    Past Medical History:  Past Medical History:   Diagnosis Date     Autoimmune hepatitis (H)      Dyslipidemia      Hypothyroidism        Surgical History:  Past Surgical History:   Procedure Laterality Date     LAPAROSCOPIC " CHOLECYSTECTOMY  03/30/2017     LIVER BIOPSY  06/24/2019    [facility]     TUBAL LIGATION         Social History:  Social History     Tobacco Use     Smoking status: Former Smoker     Types: Other     Smokeless tobacco: Never Used     Tobacco comment: Former vaper quit 02/2018   Substance Use Topics     Alcohol use: Yes     Comment: rare     Drug use: Never        Family History:  Family History   Problem Relation Age of Onset     Colon Cancer No family hx of      Autoimmune Disease No family hx of        Medications:  Current Outpatient Medications   Medication     azaTHIOprine (IMURAN) 50 MG tablet     Cholecalciferol (VITAMIN D3) 25 MCG (1000 UT) CHEW     cyanocobalamin (VITAMIN B-12) 1000 MCG tablet     fenofibrate (TRICOR) 145 MG tablet     ferrous sulfate (FEROSUL) 325 (65 Fe) MG tablet     FLUoxetine (PROZAC) 20 MG capsule     levothyroxine (SYNTHROID/LEVOTHROID) 50 MCG tablet     multivitamin w/minerals (MULTI-VITAMIN) tablet     predniSONE (DELTASONE) 1 MG tablet     predniSONE (DELTASONE) 1 MG tablet     No current facility-administered medications for this visit.       Review of Systems  A complete 10 point review of systems was asked and answered in the negative unless specifically commented upon in the HPI    Objective:         Vitals:    03/07/22 0751   BP: 124/80   Pulse: 82   Temp: 97.6  F (36.4  C)   TempSrc: Oral   SpO2: 98%   Weight: 75.1 kg (165 lb 9.6 oz)     Body mass index is 28.43 kg/m .     Physical Exam  Gen: NAD  HEENT: anicteric  Pulm: Normal respiratory excursion   Abd: soft, non-tender  Ext: no edema  MSK: normal muscle bulk    Labs and Diagnostic tests:  reviewed        Imaging:  Abd US 4/2019  liver has no overt evidence of hepatic steatosis.  No liver mass was visualized.  Liver measures 12.5 cm in craniocaudal dimension.  Patient is undergone a cholecystectomy.  The common bile duct measures 6.5 mm.  Pancreas appears unremarkable.    Procedures:  Liver biopsy: June 24, 2019.     sections show liver needle biopsy tissue measuring 2.4 cm containing approximately 15 portal tracts.  Some of the portal tracts show increased chronic inflammatory cells including numerous plasma cells and some small lymphocytes and occasional eosinophils.  The bile ducts are normal.  There is minimal interface hepatitis.  Less than 15% macrovesicular steatosis is present.  There is no ballooning degeneration.  Lobular inflammation is minimal.  There is no bridget formation, there is no cholestasis, there is no fibrosis.      Assessment and Plan:    Autoimmune hepatitis:    - Based on labs and liver biopsy,she does meet diagnostic criteria for a diagnosis of autoimmune hepatitis  - Pt with excellent allograft function at this time  -She responded well to the initial steroid burst initially, and is now on 1mg of prednisone daily  - Immunosuppression per below  - Will continue with labs every 3-6 months locally to make changes and evaluate for side effects    Immunosuppression:  -She presents today on azathioprine 75 mg daily and prednisone 1 mg daily  -She has a TPMT = 27  - We will stop the prednisone 1mg daily given normal liver tests  - Discussed long term AZA plan; hopes to wean to 50mg daily if liver tests remain stable    Side Effects of Immunosuppression:  -Patient was informed of potential side effects of prednisone including but not limited to: Diabetes, weight gain, osteoporosis, cataracts  -Patient was informed of potential side effects of azathioprine including but not limited to: Fevers, pancreatitis, bone marrow suppression, risk of lymphoma or leukemia.    -We will continue to follow labs every 3-6 months, including labs to assess for toxicity of her immunosuppression    Routine Health Care in Patient with Chronic Liver Disease:  - We recommend screening for hepatitis A and B, please vaccinate if not immune  - All patients with liver disease, particularly those with cholestatic liver disease, are at  an increased risk for osteoporosis.  We strongly recommend screening for Vitamin D deficiency at least twice yearly with aggressive supplementation/replacement as indicated.    - We also recommend a screening DEXA scan to evaluate for osteoporosis.  If present, should treat with calcium, Vitamin D supplementation, and recommend consideration of bisphosphonate therapy.  Also recommend follow up DEXA scans to evaluate for improvement of bone density on therapy.  - All patients with liver disease should avoid the use of Non-steroidal Anti-Inflammatory (NSAID) medications as they can cause significant injury to the kidneys in this population    Follow Up:  12  months     Thank you very much for the opportunity to participate in the care of this patient.  If you have any further questions, please don't hesitate to contact our office.    Thomas M. Leventhal, M.D.   of Medicine  Advanced & Transplant Hepatology  [facility]

## 2023-02-27 DIAGNOSIS — K75.4 AUTOIMMUNE HEPATITIS (H): Primary | ICD-10-CM

## 2023-02-27 DIAGNOSIS — D84.9 IMMUNOSUPPRESSION (H): ICD-10-CM

## 2023-03-02 DIAGNOSIS — K75.4 AUTOIMMUNE HEPATITIS (H): ICD-10-CM

## 2023-03-02 RX ORDER — AZATHIOPRINE 50 MG/1
50 TABLET ORAL DAILY
Qty: 30 TABLET | Refills: 11 | Status: SHIPPED | OUTPATIENT
Start: 2023-03-02 | End: 2023-03-06

## 2023-03-06 ENCOUNTER — LAB (OUTPATIENT)
Dept: LAB | Facility: CLINIC | Age: 53
End: 2023-03-06
Attending: INTERNAL MEDICINE
Payer: COMMERCIAL

## 2023-03-06 ENCOUNTER — OFFICE VISIT (OUTPATIENT)
Dept: GASTROENTEROLOGY | Facility: CLINIC | Age: 53
End: 2023-03-06
Attending: INTERNAL MEDICINE
Payer: COMMERCIAL

## 2023-03-06 VITALS
DIASTOLIC BLOOD PRESSURE: 82 MMHG | HEART RATE: 80 BPM | HEIGHT: 65 IN | WEIGHT: 166 LBS | SYSTOLIC BLOOD PRESSURE: 124 MMHG | BODY MASS INDEX: 27.66 KG/M2

## 2023-03-06 DIAGNOSIS — K75.4 AUTOIMMUNE HEPATITIS (H): ICD-10-CM

## 2023-03-06 DIAGNOSIS — Z79.899 HIGH RISK MEDICATION USE: ICD-10-CM

## 2023-03-06 DIAGNOSIS — Z12.83 SKIN CANCER SCREENING: ICD-10-CM

## 2023-03-06 DIAGNOSIS — D84.9 IMMUNOSUPPRESSION (H): ICD-10-CM

## 2023-03-06 DIAGNOSIS — D84.9 IMMUNOSUPPRESSION (H): Primary | ICD-10-CM

## 2023-03-06 LAB
ALBUMIN SERPL BCG-MCNC: 4.6 G/DL (ref 3.5–5.2)
ALP SERPL-CCNC: 124 U/L (ref 35–104)
ALT SERPL W P-5'-P-CCNC: 22 U/L (ref 10–35)
AST SERPL W P-5'-P-CCNC: 26 U/L (ref 10–35)
BILIRUB DIRECT SERPL-MCNC: <0.2 MG/DL (ref 0–0.3)
BILIRUB SERPL-MCNC: 0.2 MG/DL
ERYTHROCYTE [DISTWIDTH] IN BLOOD BY AUTOMATED COUNT: 15.2 % (ref 10–15)
HCT VFR BLD AUTO: 41.3 % (ref 35–47)
HGB BLD-MCNC: 13.7 G/DL (ref 11.7–15.7)
IGG SERPL-MCNC: 1397 MG/DL (ref 610–1616)
MCH RBC QN AUTO: 30.8 PG (ref 26.5–33)
MCHC RBC AUTO-ENTMCNC: 33.2 G/DL (ref 31.5–36.5)
MCV RBC AUTO: 93 FL (ref 78–100)
PLATELET # BLD AUTO: 319 10E3/UL (ref 150–450)
PROT SERPL-MCNC: 7.9 G/DL (ref 6.4–8.3)
RBC # BLD AUTO: 4.45 10E6/UL (ref 3.8–5.2)
WBC # BLD AUTO: 5.9 10E3/UL (ref 4–11)

## 2023-03-06 PROCEDURE — 99214 OFFICE O/P EST MOD 30 MIN: CPT | Performed by: INTERNAL MEDICINE

## 2023-03-06 PROCEDURE — 85027 COMPLETE CBC AUTOMATED: CPT | Performed by: PATHOLOGY

## 2023-03-06 PROCEDURE — 82784 ASSAY IGA/IGD/IGG/IGM EACH: CPT | Mod: 90 | Performed by: PATHOLOGY

## 2023-03-06 PROCEDURE — 36415 COLL VENOUS BLD VENIPUNCTURE: CPT | Performed by: PATHOLOGY

## 2023-03-06 PROCEDURE — 99000 SPECIMEN HANDLING OFFICE-LAB: CPT | Performed by: PATHOLOGY

## 2023-03-06 PROCEDURE — 80076 HEPATIC FUNCTION PANEL: CPT | Performed by: PATHOLOGY

## 2023-03-06 PROCEDURE — G0463 HOSPITAL OUTPT CLINIC VISIT: HCPCS | Performed by: INTERNAL MEDICINE

## 2023-03-06 RX ORDER — AZATHIOPRINE 50 MG/1
50 TABLET ORAL DAILY
Qty: 90 TABLET | Refills: 3 | Status: SHIPPED | OUTPATIENT
Start: 2023-03-06 | End: 2024-03-11

## 2023-03-06 RX ORDER — ESTRADIOL 0.05 MG/D
PATCH, EXTENDED RELEASE TRANSDERMAL
COMMUNITY
Start: 2023-02-26

## 2023-03-06 ASSESSMENT — PAIN SCALES - GENERAL: PAINLEVEL: MODERATE PAIN (4)

## 2023-03-06 NOTE — PATIENT INSTRUCTIONS
- We will decrease the azathioprine to 50mg daily (from 75mg daily)    - Please get labs in: June, September, December 2023 and we will get them again in March 2024    - Speak with your primary about a skin check or see a local dermatologist    Follow up 1 year

## 2023-03-06 NOTE — LETTER
3/6/2023         RE: Crista Amador  [address]  [facility] [address]        Dear Colleague,    Thank you for referring your patient, Crista Amador, to the [facility] [place]. Please see a copy of my visit note below.    Date of Service:March 6, 2023     Subjective:            Crista Amador (Kolb) is a 52 year old female presenting for follow up of liver disease    History of Present Illness   Crista Amador (Kolb) is a 52 year old female with past medical history of dyslipidemia, depression, and who presents in follow up of her biopsy proven autoimmune hepatitis.    Since last seen she expresses myriad medical issues.  She been having ongoing issues with abnormal uterine bleeding ultimately underwent undergoing a total abdominal hysterectomy with oophorectomy in September 2022.  Reports that she was hospitalized for approximately 3 days and no sequela.  She does use an estrogen patch for hormone replacement therapy and notes minimal hot flashes while on this medication.  She does note that she has been having issues with back pain and spasms that radiate along her right flank.  She has been using conservative cares for this with intermittent success.    Denies any overt issues with abdominal pain, denies changes in her appetite.  Denies any significant weight changes.  Denies any recent infections and otherwise denies any trips to the emergency department.    She continues on her gemfibrozil for management of her hypertriglyceridemia    For the past year she has been on azathioprine 75 mg daily and has been off of prednisone for that time.    Denies current issues with abdominal pain, fevers, chills, nausea, emesis.    Past Medical History:  Past Medical History:   Diagnosis Date     Autoimmune hepatitis (H)      Dyslipidemia      Hypothyroidism        Surgical History:  Past Surgical History:   Procedure Laterality Date     LAPAROSCOPIC CHOLECYSTECTOMY   "03/30/2017     LIVER BIOPSY  06/24/2019    [facility] [place]     TUBAL LIGATION         Social History:  Social History     Tobacco Use     Smoking status: Former     Types: Other     Smokeless tobacco: Never     Tobacco comments:     Former vaper quit 02/2018   Substance Use Topics     Alcohol use: Yes     Comment: rare     Drug use: Never        Family History:  Family History   Problem Relation Age of Onset     Colon Cancer No family hx of      Autoimmune Disease No family hx of        Medications:  Current Outpatient Medications   Medication     azaTHIOprine (IMURAN) 50 MG tablet     Cholecalciferol (VITAMIN D3) 25 MCG (1000 UT) CHEW     estradiol (VIVELLE-DOT) 0.05 MG/24HR bi-weekly patch     ferrous sulfate (FEROSUL) 325 (65 Fe) MG tablet     FLUoxetine (PROZAC) 20 MG capsule     levothyroxine (SYNTHROID/LEVOTHROID) 50 MCG tablet     multivitamin w/minerals (THERA-VIT-M) tablet     cyanocobalamin (VITAMIN B-12) 1000 MCG tablet     fenofibrate (TRICOR) 145 MG tablet     No current facility-administered medications for this visit.       Review of Systems  A complete 10 point review of systems was asked and answered in the negative unless specifically commented upon in the HPI    Objective:         Vitals:    03/06/23 0751   BP: 124/82   Pulse: 80   Weight: 75.3 kg (166 lb)   Height: 1.651 m (5' 5\")     Body mass index is 27.62 kg/m .     Physical Exam  Gen: NAD  HEENT: anicteric  Pulm: Normal respiratory excursion   Abd: soft, non-tender  Ext: no edema  MSK: normal muscle bulk.  Back - non tender to exam    Labs and Diagnostic tests:  reviewed        Imaging:  Abd US 4/2019  liver has no overt evidence of hepatic steatosis.  No liver mass was visualized.  Liver measures 12.5 cm in craniocaudal dimension.  Patient is undergone a cholecystectomy.  The common bile duct measures 6.5 mm.  Pancreas appears unremarkable.    Procedures:  Liver biopsy: June 24, 2019.    sections show liver needle biopsy tissue measuring " 2.4 cm containing approximately 15 portal tracts.  Some of the portal tracts show increased chronic inflammatory cells including numerous plasma cells and some small lymphocytes and occasional eosinophils.  The bile ducts are normal.  There is minimal interface hepatitis.  Less than 15% macrovesicular steatosis is present.  There is no ballooning degeneration.  Lobular inflammation is minimal.  There is no bridget formation, there is no cholestasis, there is no fibrosis.      Assessment and Plan:    Autoimmune hepatitis:    - Pt with excellent allograft function at this time  -She responded well to the initial steroid burst and weaned off 3/2022  - Immunosuppression per below  - Will continue with labs every 3 months locally to make changes and evaluate for side effects    Immunosuppression:  -She presents today on azathioprine 75 mg daily  -She has a TPMT = 27  - Based on stability of liver tests, will attempt wean to AZA 50mg daily     - Labs every 3 months for stability    Side Effects of Immunosuppression:  -Patient was informed of potential side effects of azathioprine including but not limited to: Fevers, pancreatitis, bone marrow suppression, risk of lymphoma or leukemia.      - Discussed how azathioprine use can increase risk for skin cancers, and encouraged her to have yearly skin exams with primary or a dermatologist  -We will continue to follow labs every 3 months, including labs to assess for toxicity of her immunosuppression    Routine Health Care in Patient with Chronic Liver Disease:  - We recommend screening for hepatitis A and B, please vaccinate if not immune  - All patients with liver disease, particularly those with cholestatic liver disease, are at an increased risk for osteoporosis.  We strongly recommend screening for Vitamin D deficiency at least twice yearly with aggressive supplementation/replacement as indicated.    - We also recommend a screening DEXA scan to evaluate for osteoporosis.   If present, should treat with calcium, Vitamin D supplementation, and recommend consideration of bisphosphonate therapy.  Also recommend follow up DEXA scans to evaluate for improvement of bone density on therapy.  - All patients with liver disease should avoid the use of Non-steroidal Anti-Inflammatory (NSAID) medications as they can cause significant injury to the kidneys in this population    Follow Up:  12  months     Thank you very much for the opportunity to participate in the care of this patient.  If you have any further questions, please don't hesitate to contact our office.    Thomas M. Leventhal, M.D.   of Medicine  Advanced & Transplant Hepatology  [facility]

## 2023-03-06 NOTE — PROGRESS NOTES
Date of Service:March 6, 2023     Subjective:            Crista Amador (Kolb) is a 52 year old female presenting for follow up of liver disease    History of Present Illness   Crista Amador (Kolb) is a 52 year old female with past medical history of dyslipidemia, depression, and who presents in follow up of her biopsy proven autoimmune hepatitis.    Since last seen she expresses myriad medical issues.  She been having ongoing issues with abnormal uterine bleeding ultimately underwent undergoing a total abdominal hysterectomy with oophorectomy in September 2022.  Reports that she was hospitalized for approximately 3 days and no sequela.  She does use an estrogen patch for hormone replacement therapy and notes minimal hot flashes while on this medication.  She does note that she has been having issues with back pain and spasms that radiate along her right flank.  She has been using conservative cares for this with intermittent success.    Denies any overt issues with abdominal pain, denies changes in her appetite.  Denies any significant weight changes.  Denies any recent infections and otherwise denies any trips to the emergency department.    She continues on her gemfibrozil for management of her hypertriglyceridemia    For the past year she has been on azathioprine 75 mg daily and has been off of prednisone for that time.    Denies current issues with abdominal pain, fevers, chills, nausea, emesis.    Past Medical History:  Past Medical History:   Diagnosis Date     Autoimmune hepatitis (H)      Dyslipidemia      Hypothyroidism        Surgical History:  Past Surgical History:   Procedure Laterality Date     LAPAROSCOPIC CHOLECYSTECTOMY  03/30/2017     LIVER BIOPSY  06/24/2019    [facility]     TUBAL LIGATION         Social History:  Social History     Tobacco Use     Smoking status: Former     Types: Other     Smokeless tobacco: Never     Tobacco comments:     Former vaper quit 02/2018   Substance Use  "Topics     Alcohol use: Yes     Comment: rare     Drug use: Never        Family History:  Family History   Problem Relation Age of Onset     Colon Cancer No family hx of      Autoimmune Disease No family hx of        Medications:  Current Outpatient Medications   Medication     azaTHIOprine (IMURAN) 50 MG tablet     Cholecalciferol (VITAMIN D3) 25 MCG (1000 UT) CHEW     estradiol (VIVELLE-DOT) 0.05 MG/24HR bi-weekly patch     ferrous sulfate (FEROSUL) 325 (65 Fe) MG tablet     FLUoxetine (PROZAC) 20 MG capsule     levothyroxine (SYNTHROID/LEVOTHROID) 50 MCG tablet     multivitamin w/minerals (THERA-VIT-M) tablet     cyanocobalamin (VITAMIN B-12) 1000 MCG tablet     fenofibrate (TRICOR) 145 MG tablet     No current facility-administered medications for this visit.       Review of Systems  A complete 10 point review of systems was asked and answered in the negative unless specifically commented upon in the HPI    Objective:         Vitals:    03/06/23 0751   BP: 124/82   Pulse: 80   Weight: 75.3 kg (166 lb)   Height: 1.651 m (5' 5\")     Body mass index is 27.62 kg/m .     Physical Exam  Gen: NAD  HEENT: anicteric  Pulm: Normal respiratory excursion   Abd: soft, non-tender  Ext: no edema  MSK: normal muscle bulk.  Back - non tender to exam    Labs and Diagnostic tests:  reviewed        Imaging:  Abd US 4/2019  liver has no overt evidence of hepatic steatosis.  No liver mass was visualized.  Liver measures 12.5 cm in craniocaudal dimension.  Patient is undergone a cholecystectomy.  The common bile duct measures 6.5 mm.  Pancreas appears unremarkable.    Procedures:  Liver biopsy: June 24, 2019.    sections show liver needle biopsy tissue measuring 2.4 cm containing approximately 15 portal tracts.  Some of the portal tracts show increased chronic inflammatory cells including numerous plasma cells and some small lymphocytes and occasional eosinophils.  The bile ducts are normal.  There is minimal interface hepatitis.  " Less than 15% macrovesicular steatosis is present.  There is no ballooning degeneration.  Lobular inflammation is minimal.  There is no bridget formation, there is no cholestasis, there is no fibrosis.      Assessment and Plan:    Autoimmune hepatitis:    - Pt with excellent allograft function at this time  -She responded well to the initial steroid burst and weaned off 3/2022  - Immunosuppression per below  - Will continue with labs every 3 months locally to make changes and evaluate for side effects    Immunosuppression:  -She presents today on azathioprine 75 mg daily  -She has a TPMT = 27  - Based on stability of liver tests, will attempt wean to AZA 50mg daily     - Labs every 3 months for stability    Side Effects of Immunosuppression:  -Patient was informed of potential side effects of azathioprine including but not limited to: Fevers, pancreatitis, bone marrow suppression, risk of lymphoma or leukemia.      - Discussed how azathioprine use can increase risk for skin cancers, and encouraged her to have yearly skin exams with primary or a dermatologist  -We will continue to follow labs every 3 months, including labs to assess for toxicity of her immunosuppression    Routine Health Care in Patient with Chronic Liver Disease:  - We recommend screening for hepatitis A and B, please vaccinate if not immune  - All patients with liver disease, particularly those with cholestatic liver disease, are at an increased risk for osteoporosis.  We strongly recommend screening for Vitamin D deficiency at least twice yearly with aggressive supplementation/replacement as indicated.    - We also recommend a screening DEXA scan to evaluate for osteoporosis.  If present, should treat with calcium, Vitamin D supplementation, and recommend consideration of bisphosphonate therapy.  Also recommend follow up DEXA scans to evaluate for improvement of bone density on therapy.  - All patients with liver disease should avoid the use of  Non-steroidal Anti-Inflammatory (NSAID) medications as they can cause significant injury to the kidneys in this population    Follow Up:  12  months     Thank you very much for the opportunity to participate in the care of this patient.  If you have any further questions, please don't hesitate to contact our office.    Thomas M. Leventhal, M.D.   of Medicine  Advanced & Transplant Hepatology  [facility]

## 2023-03-06 NOTE — NURSING NOTE
"Chief Complaint   Patient presents with     RECHECK     Follow up with autoimmune hepatitis     /82   Pulse 80   Ht 1.651 m (5' 5\")   Wt 75.3 kg (166 lb)   BMI 27.62 kg/m    Perla Jarvis CMA on 3/6/2023 at 7:55 AM    "

## 2024-02-06 ENCOUNTER — TRANSFERRED RECORDS (OUTPATIENT)
Dept: HEALTH INFORMATION MANAGEMENT | Facility: CLINIC | Age: 54
End: 2024-02-06
Payer: COMMERCIAL

## 2024-02-06 LAB
ALT SERPL-CCNC: 44 UNIT/L (ref 7–55)
AST SERPL-CCNC: 28 UNIT/L (ref 14–41)

## 2024-03-11 ENCOUNTER — LAB (OUTPATIENT)
Dept: LAB | Facility: CLINIC | Age: 54
End: 2024-03-11
Payer: COMMERCIAL

## 2024-03-11 ENCOUNTER — OFFICE VISIT (OUTPATIENT)
Dept: GASTROENTEROLOGY | Facility: CLINIC | Age: 54
End: 2024-03-11
Attending: INTERNAL MEDICINE
Payer: COMMERCIAL

## 2024-03-11 VITALS
HEART RATE: 75 BPM | DIASTOLIC BLOOD PRESSURE: 79 MMHG | OXYGEN SATURATION: 96 % | BODY MASS INDEX: 29.95 KG/M2 | TEMPERATURE: 97.4 F | SYSTOLIC BLOOD PRESSURE: 116 MMHG | WEIGHT: 180 LBS

## 2024-03-11 DIAGNOSIS — K75.4 AUTOIMMUNE HEPATITIS (H): ICD-10-CM

## 2024-03-11 DIAGNOSIS — D84.9 IMMUNOSUPPRESSION (H): Primary | ICD-10-CM

## 2024-03-11 DIAGNOSIS — D84.9 IMMUNOSUPPRESSION (H): ICD-10-CM

## 2024-03-11 LAB
ALBUMIN SERPL BCG-MCNC: 4.5 G/DL (ref 3.5–5.2)
ALP SERPL-CCNC: 127 U/L (ref 40–150)
ALT SERPL W P-5'-P-CCNC: 34 U/L (ref 0–50)
AST SERPL W P-5'-P-CCNC: 44 U/L (ref 0–45)
BILIRUB DIRECT SERPL-MCNC: <0.2 MG/DL (ref 0–0.3)
BILIRUB SERPL-MCNC: 0.2 MG/DL
ERYTHROCYTE [DISTWIDTH] IN BLOOD BY AUTOMATED COUNT: 12.4 % (ref 10–15)
HCT VFR BLD AUTO: 40.3 % (ref 35–47)
HGB BLD-MCNC: 14.5 G/DL (ref 11.7–15.7)
IGG SERPL-MCNC: 1279 MG/DL (ref 610–1616)
MCH RBC QN AUTO: 33.7 PG (ref 26.5–33)
MCHC RBC AUTO-ENTMCNC: 36 G/DL (ref 31.5–36.5)
MCV RBC AUTO: 94 FL (ref 78–100)
PLATELET # BLD AUTO: 305 10E3/UL (ref 150–450)
PROT SERPL-MCNC: 7.7 G/DL (ref 6.4–8.3)
RBC # BLD AUTO: 4.3 10E6/UL (ref 3.8–5.2)
WBC # BLD AUTO: 6.4 10E3/UL (ref 4–11)

## 2024-03-11 PROCEDURE — 85027 COMPLETE CBC AUTOMATED: CPT | Performed by: PATHOLOGY

## 2024-03-11 PROCEDURE — 80076 HEPATIC FUNCTION PANEL: CPT | Performed by: PATHOLOGY

## 2024-03-11 PROCEDURE — 99213 OFFICE O/P EST LOW 20 MIN: CPT | Performed by: INTERNAL MEDICINE

## 2024-03-11 PROCEDURE — 82784 ASSAY IGA/IGD/IGG/IGM EACH: CPT | Performed by: INTERNAL MEDICINE

## 2024-03-11 PROCEDURE — 99000 SPECIMEN HANDLING OFFICE-LAB: CPT | Performed by: PATHOLOGY

## 2024-03-11 PROCEDURE — 36415 COLL VENOUS BLD VENIPUNCTURE: CPT | Performed by: PATHOLOGY

## 2024-03-11 PROCEDURE — 99214 OFFICE O/P EST MOD 30 MIN: CPT | Performed by: INTERNAL MEDICINE

## 2024-03-11 RX ORDER — AZATHIOPRINE 50 MG/1
50 TABLET ORAL DAILY
Qty: 90 TABLET | Refills: 3 | Status: SHIPPED | OUTPATIENT
Start: 2024-03-11

## 2024-03-11 ASSESSMENT — PAIN SCALES - GENERAL: PAINLEVEL: NO PAIN (0)

## 2024-03-11 NOTE — LETTER
3/11/2024         RE: Crista Amador  [address]  [facility] [address]        Dear Colleague,    Thank you for referring your patient, Cirsta Amador, to the [facility] [place]. Please see a copy of my visit note below.    Date of Service: March 11, 2024     Subjective:            Crista Amador (Kolb) is a 53 year old female presenting for follow up of liver disease    History of Present Illness   Crista Amador (Kolb) is a 53 year old female with past medical history of dyslipidemia, depression, and who presents in follow up of her biopsy proven autoimmune hepatitis.    Since last seen she reports that she is doing okay.  She has had some significant issues with bilateral plantar fasciitis which has made ambulation and walking quite difficult.  She notes that she does get some swelling of her feet at the end of the day.    In the setting of decreased activity she notes that she has not made significant changes in her overall diet, and has had approximately 10 to 15 pound weight gain since last seen 1 year ago.  She notes that her partner has a very different diet plan than she, and she notes this makes meal planning very challenging.    She feels that she is experiencing significant swelling of bilateral upper extremities and lower extremities.  She reports she has mentioned this to her primary provider who has checked labs, but no overt diagnosis at this time    When I saw her in 2023 we decreased her azathioprine exposure to 50 mg daily: On this dose she has been able to maintain normal liver tests, albeit with gradual increase in all numbers    Denies current issues with abdominal pain, fevers, chills, nausea, emesis.    Past Medical History:  Past Medical History:   Diagnosis Date     Autoimmune hepatitis (H)      Dyslipidemia      Hypothyroidism        Surgical History:  Past Surgical History:   Procedure Laterality Date     LAPAROSCOPIC CHOLECYSTECTOMY   03/30/2017     LIVER BIOPSY  06/24/2019    [facility] [place]     TUBAL LIGATION         Social History:  Social History     Tobacco Use     Smoking status: Former     Types: Other     Smokeless tobacco: Never     Tobacco comments:     Former vaper quit 02/2018   Substance Use Topics     Alcohol use: Yes     Comment: rare     Drug use: Never        Family History:  Family History   Problem Relation Age of Onset     Colon Cancer No family hx of      Autoimmune Disease No family hx of        Medications:  Current Outpatient Medications   Medication     azaTHIOprine (IMURAN) 50 MG tablet     Cholecalciferol (VITAMIN D3) 25 MCG (1000 UT) CHEW     estradiol (VIVELLE-DOT) 0.05 MG/24HR bi-weekly patch     ferrous sulfate (FEROSUL) 325 (65 Fe) MG tablet     FLUoxetine (PROZAC) 20 MG capsule     levothyroxine (SYNTHROID/LEVOTHROID) 50 MCG tablet     multivitamin w/minerals (THERA-VIT-M) tablet     cyanocobalamin (VITAMIN B-12) 1000 MCG tablet     fenofibrate (TRICOR) 145 MG tablet     No current facility-administered medications for this visit.       Review of Systems  A complete 10 point review of systems was asked and answered in the negative unless specifically commented upon in the HPI    Objective:         Vitals:    03/11/24 0957   BP: 116/79   Pulse: 75   Temp: 97.4  F (36.3  C)   TempSrc: Oral   SpO2: 96%   Weight: 81.6 kg (180 lb)     Body mass index is 29.95 kg/m .     Physical Exam  Gen: NAD  HEENT: anicteric  Pulm: Normal respiratory excursion   Abd: soft, non-tender, mild obesity  Ext: no edema  MSK: normal muscle bulk.  Back - non tender to exam    Labs and Diagnostic tests:  reviewed    Imaging:  Abd US 4/2019  liver has no overt evidence of hepatic steatosis.  No liver mass was visualized.  Liver measures 12.5 cm in craniocaudal dimension.  Patient is undergone a cholecystectomy.  The common bile duct measures 6.5 mm.  Pancreas appears unremarkable.    Procedures:  Liver biopsy: June 24, 2019.    sections show  liver needle biopsy tissue measuring 2.4 cm containing approximately 15 portal tracts.  Some of the portal tracts show increased chronic inflammatory cells including numerous plasma cells and some small lymphocytes and occasional eosinophils.  The bile ducts are normal.  There is minimal interface hepatitis.  Less than 15% macrovesicular steatosis is present.  There is no ballooning degeneration.  Lobular inflammation is minimal.  There is no bridget formation, there is no cholestasis, there is no fibrosis.      Assessment and Plan:    Autoimmune hepatitis:    - Pt with excellent allograft function at this time  - Off steroids 3/2022  - Felt that elevation of all liver tests more likely related to fatty liver changes; management as per below  - Will continue with labs every 3 months locally to make changes and evaluate for side effects    Immunosuppression:  -She presents today on azathioprine 50mg daily  -She has a TPMT = 27  - Based on stability of liver tests, will continue on AZA 50mg daily     - Labs every 6 months for stability    Steatotic Liver Disease (SLD, formerly referred to as Non-alcoholic Fatty Liver Disease)  - I had a long discussion with the patient about metabolic dysfunction-associated steatotic liver disease (MASLD).  We discussed how fat deposits in the liver, how this leads to inflammation, and how chronic inflammation (metabolic dysfunction-associated steatohepatitis/MASH) can ultimately lead to scarring and cirrhosis.  The long-term complications of fatty liver disease were discussed with the patient, including the increased risk of cardiovascular disease complications,the risk of developing diabetes (if not already), and variable progression to cirrhosis and end stage liver disease.  Management of MASLD/MASH  - We spent time discussing necessary lifestyle modifications including: appropriate diet, exercise and weight loss plan.      - Recommend exercise regularly: at least 4 times per week,  with a minimum of 40-45 minutes per day.      - It has shown that patients who exercise regularly can have improvement of insulin resistance, increase in baseline metabolic activity and resolution of fatty liver disease (even if  appreciable weight loss does not occur)    - Recommend a low-carbohydrate, low-calorie diet (~1700 calories per day).    - An ideal weight loss plan would be to lose 7-10% of body weight over the next six months.  This has been proven to resolve fat and inflammation in the liver, and can lead to regression of scarring that might be present  - If the patient has diabetes, we recommend assertive management: ideal goal Hgb A1c goal of =/< 7%.     - There are no overt contraindications to medications used in the treatment of diabetes in persons with chronic liver disease  - Management of cholesterol is also very important.    - The use of statins are an effective means of therapy and are not contra-indicated in those with abnormal liver tests OR those with cirrhosis.  The value of these medications in this population far outweigh the minor risks of abnormal liver tests.   - Goal LDL in those with VALENCIA are < 100 mg/dL  - Consider the utility of liberalizing coffee consumption as some data that this may slow progression and reverse effects of VALENCIA-related fibrosis.    Side Effects of Immunosuppression:  -Patient was informed of potential side effects of azathioprine including but not limited to: Fevers, pancreatitis, bone marrow suppression, risk of lymphoma or leukemia.      - Discussed how azathioprine use can increase risk for skin cancers, and encouraged her to have yearly skin exams with primary or a dermatologist  -We will continue to follow labs every 6 months, including labs to assess for toxicity of her immunosuppression    Routine Health Care in Patient with Chronic Liver Disease:  - We recommend screening for hepatitis A and B, please vaccinate if not immune  - All patients with liver  disease, particularly those with cholestatic liver disease, are at an increased risk for osteoporosis.  We strongly recommend screening for Vitamin D deficiency at least twice yearly with aggressive supplementation/replacement as indicated.    - We also recommend a screening DEXA scan to evaluate for osteoporosis.  If present, should treat with calcium, Vitamin D supplementation, and recommend consideration of bisphosphonate therapy.  Also recommend follow up DEXA scans to evaluate for improvement of bone density on therapy.  - All patients with liver disease should avoid the use of Non-steroidal Anti-Inflammatory (NSAID) medications as they can cause significant injury to the kidneys in this population    Follow Up:  12  months     Thank you very much for the opportunity to participate in the care of this patient.  If you have any further questions, please don't hesitate to contact our office.    Thomas M. Leventhal, M.D.   of Medicine  Advanced & Transplant Hepatology  [facility]

## 2024-03-11 NOTE — PROGRESS NOTES
Date of Service: March 11, 2024     Subjective:            Crista Amador ([place]) is a 53 year old female presenting for follow up of liver disease    History of Present Illness   Crista Amador ([place]) is a 53 year old female with past medical history of dyslipidemia, depression, and who presents in follow up of her biopsy proven autoimmune hepatitis.    Since last seen she reports that she is doing okay.  She has had some significant issues with bilateral plantar fasciitis which has made ambulation and walking quite difficult.  She notes that she does get some swelling of her feet at the end of the day.    In the setting of decreased activity she notes that she has not made significant changes in her overall diet, and has had approximately 10 to 15 pound weight gain since last seen 1 year ago.  She notes that her partner has a very different diet plan than she, and she notes this makes meal planning very challenging.    She feels that she is experiencing significant swelling of bilateral upper extremities and lower extremities.  She reports she has mentioned this to her primary provider who has checked labs, but no overt diagnosis at this time    When I saw her in 2023 we decreased her azathioprine exposure to 50 mg daily: On this dose she has been able to maintain normal liver tests, albeit with gradual increase in all numbers    Denies current issues with abdominal pain, fevers, chills, nausea, emesis.    Past Medical History:  Past Medical History:   Diagnosis Date    Autoimmune hepatitis (H)     Dyslipidemia     Hypothyroidism        Surgical History:  Past Surgical History:   Procedure Laterality Date    LAPAROSCOPIC CHOLECYSTECTOMY  03/30/2017    LIVER BIOPSY  06/24/2019    [facility] [place]    TUBAL LIGATION         Social History:  Social History     Tobacco Use    Smoking status: Former     Types: Other    Smokeless tobacco: Never    Tobacco comments:     Former vaper quit 02/2018   Substance Use Topics     Alcohol use: Yes     Comment: rare    Drug use: Never        Family History:  Family History   Problem Relation Age of Onset    Colon Cancer No family hx of     Autoimmune Disease No family hx of        Medications:  Current Outpatient Medications   Medication    azaTHIOprine (IMURAN) 50 MG tablet    Cholecalciferol (VITAMIN D3) 25 MCG (1000 UT) CHEW    estradiol (VIVELLE-DOT) 0.05 MG/24HR bi-weekly patch    ferrous sulfate (FEROSUL) 325 (65 Fe) MG tablet    FLUoxetine (PROZAC) 20 MG capsule    levothyroxine (SYNTHROID/LEVOTHROID) 50 MCG tablet    multivitamin w/minerals (THERA-VIT-M) tablet    cyanocobalamin (VITAMIN B-12) 1000 MCG tablet    fenofibrate (TRICOR) 145 MG tablet     No current facility-administered medications for this visit.       Review of Systems  A complete 10 point review of systems was asked and answered in the negative unless specifically commented upon in the HPI    Objective:         Vitals:    03/11/24 0957   BP: 116/79   Pulse: 75   Temp: 97.4  F (36.3  C)   TempSrc: Oral   SpO2: 96%   Weight: 81.6 kg (180 lb)     Body mass index is 29.95 kg/m .     Physical Exam  Gen: NAD  HEENT: anicteric  Pulm: Normal respiratory excursion   Abd: soft, non-tender, mild obesity  Ext: no edema  MSK: normal muscle bulk.  Back - non tender to exam    Labs and Diagnostic tests:  reviewed    Imaging:  Abd US 4/2019  liver has no overt evidence of hepatic steatosis.  No liver mass was visualized.  Liver measures 12.5 cm in craniocaudal dimension.  Patient is undergone a cholecystectomy.  The common bile duct measures 6.5 mm.  Pancreas appears unremarkable.    Procedures:  Liver biopsy: June 24, 2019.    sections show liver needle biopsy tissue measuring 2.4 cm containing approximately 15 portal tracts.  Some of the portal tracts show increased chronic inflammatory cells including numerous plasma cells and some small lymphocytes and occasional eosinophils.  The bile ducts are normal.  There is minimal  interface hepatitis.  Less than 15% macrovesicular steatosis is present.  There is no ballooning degeneration.  Lobular inflammation is minimal.  There is no bridget formation, there is no cholestasis, there is no fibrosis.      Assessment and Plan:    Autoimmune hepatitis:    - Pt with excellent allograft function at this time  - Off steroids 3/2022  - Felt that elevation of all liver tests more likely related to fatty liver changes; management as per below  - Will continue with labs every 3 months locally to make changes and evaluate for side effects    Immunosuppression:  -She presents today on azathioprine 50mg daily  -She has a TPMT = 27  - Based on stability of liver tests, will continue on AZA 50mg daily     - Labs every 6 months for stability    Steatotic Liver Disease (SLD, formerly referred to as Non-alcoholic Fatty Liver Disease)  - I had a long discussion with the patient about metabolic dysfunction-associated steatotic liver disease (MASLD).  We discussed how fat deposits in the liver, how this leads to inflammation, and how chronic inflammation (metabolic dysfunction-associated steatohepatitis/MASH) can ultimately lead to scarring and cirrhosis.  The long-term complications of fatty liver disease were discussed with the patient, including the increased risk of cardiovascular disease complications,the risk of developing diabetes (if not already), and variable progression to cirrhosis and end stage liver disease.  Management of MASLD/MASH  - We spent time discussing necessary lifestyle modifications including: appropriate diet, exercise and weight loss plan.      - Recommend exercise regularly: at least 4 times per week, with a minimum of 40-45 minutes per day.      - It has shown that patients who exercise regularly can have improvement of insulin resistance, increase in baseline metabolic activity and resolution of fatty liver disease (even if  appreciable weight loss does not occur)    - Recommend a  low-carbohydrate, low-calorie diet (~1700 calories per day).    - An ideal weight loss plan would be to lose 7-10% of body weight over the next six months.  This has been proven to resolve fat and inflammation in the liver, and can lead to regression of scarring that might be present  - If the patient has diabetes, we recommend assertive management: ideal goal Hgb A1c goal of =/< 7%.     - There are no overt contraindications to medications used in the treatment of diabetes in persons with chronic liver disease  - Management of cholesterol is also very important.    - The use of statins are an effective means of therapy and are not contra-indicated in those with abnormal liver tests OR those with cirrhosis.  The value of these medications in this population far outweigh the minor risks of abnormal liver tests.   - Goal LDL in those with VALENCIA are < 100 mg/dL  - Consider the utility of liberalizing coffee consumption as some data that this may slow progression and reverse effects of VALENCIA-related fibrosis.    Side Effects of Immunosuppression:  -Patient was informed of potential side effects of azathioprine including but not limited to: Fevers, pancreatitis, bone marrow suppression, risk of lymphoma or leukemia.      - Discussed how azathioprine use can increase risk for skin cancers, and encouraged her to have yearly skin exams with primary or a dermatologist  -We will continue to follow labs every 6 months, including labs to assess for toxicity of her immunosuppression    Routine Health Care in Patient with Chronic Liver Disease:  - We recommend screening for hepatitis A and B, please vaccinate if not immune  - All patients with liver disease, particularly those with cholestatic liver disease, are at an increased risk for osteoporosis.  We strongly recommend screening for Vitamin D deficiency at least twice yearly with aggressive supplementation/replacement as indicated.    - We also recommend a screening DEXA scan  to evaluate for osteoporosis.  If present, should treat with calcium, Vitamin D supplementation, and recommend consideration of bisphosphonate therapy.  Also recommend follow up DEXA scans to evaluate for improvement of bone density on therapy.  - All patients with liver disease should avoid the use of Non-steroidal Anti-Inflammatory (NSAID) medications as they can cause significant injury to the kidneys in this population    Follow Up:  12  months     Thank you very much for the opportunity to participate in the care of this patient.  If you have any further questions, please don't hesitate to contact our office.    Thomas M. Leventhal, M.D.   of Medicine  Advanced & Transplant Hepatology  [facility]

## 2024-03-11 NOTE — PATIENT INSTRUCTIONS
Steatotic Liver Disease (SLD, formerly referred to as Non-alcoholic Fatty Liver Disease)  - I had a long discussion with the patient about metabolic dysfunction-associated steatotic liver disease (MASLD).  We discussed how fat deposits in the liver, how this leads to inflammation, and how chronic inflammation (metabolic dysfunction-associated steatohepatitis/MASH) can ultimately lead to scarring and cirrhosis.  The long-term complications of fatty liver disease were discussed with the patient, including the increased risk of cardiovascular disease complications,the risk of developing diabetes (if not already), and variable progression to cirrhosis and end stage liver disease.    Management of MASLD/MASH  - We spent time discussing necessary lifestyle modifications including: appropriate diet, exercise and weight loss plan.      - Recommend exercise regularly: at least 4 times per week, with a minimum of 40-45 minutes per day.      - It has shown that patients who exercise regularly can have improvement of insulin resistance, increase in baseline metabolic activity and resolution of fatty liver disease (even if  appreciable weight loss does not occur)    - Recommend a low-carbohydrate, low-calorie diet (~1700 calories per day).    - An ideal weight loss plan would be to lose 7-10% of body weight over the next six months.  This has been proven to resolve fat and inflammation in the liver, and can lead to regression of scarring that might be present  - If the patient has diabetes, we recommend assertive management: ideal goal Hgb A1c goal of =/< 7%.     - There are no overt contraindications to medications used in the treatment of diabetes in persons with chronic liver disease  - Management of cholesterol is also very important.    - The use of statins are an effective means of therapy and are not contra-indicated in those with abnormal liver tests OR those with cirrhosis.  The value of these medications in this  population far outweigh the minor risks of abnormal liver tests.   - Goal LDL in those with VALENCIA are < 100 mg/dL  - Consider the utility of liberalizing coffee consumption as some data that this may slow progression and reverse effects of VALENCIA-related fibrosis.

## 2024-03-13 ENCOUNTER — TELEPHONE (OUTPATIENT)
Dept: GASTROENTEROLOGY | Facility: CLINIC | Age: 54
End: 2024-03-13
Payer: COMMERCIAL

## 2024-03-13 NOTE — TELEPHONE ENCOUNTER
Left message to see if patient would be like to transfer care to local GI doctor per Dr. Leventhal. Asked that patient call back if that is something she is open to, number to clinic call center given to patient.    Krissy FLOREZ LPN  Hepatology Clinic       ----- Message from Thomas Michael Leventhal, MD sent at 3/13/2024  8:44 AM CDT -----  Her labs look great    Could you do me a favor  - Could you rech out to see if she would [place] as she really needs a general gastroenterologist, and not me    Rafael Byers is a great GI doc in [facility]  ----- Message -----  From: Lab, Background User  Sent: 3/11/2024   9:18 AM CDT  To: Thomas Michael Leventhal, MD

## 2024-03-21 ENCOUNTER — TELEPHONE (OUTPATIENT)
Dept: GASTROENTEROLOGY | Facility: CLINIC | Age: 54
End: 2024-03-21
Payer: COMMERCIAL

## 2025-02-13 DIAGNOSIS — K75.4 AUTOIMMUNE HEPATITIS (H): Primary | ICD-10-CM

## 2025-02-13 DIAGNOSIS — K76.0 METABOLIC DYSFUNCTION-ASSOCIATED STEATOTIC LIVER DISEASE (MASLD): ICD-10-CM

## 2025-03-04 ENCOUNTER — LAB (OUTPATIENT)
Dept: LAB | Facility: CLINIC | Age: 55
End: 2025-03-04
Payer: COMMERCIAL

## 2025-03-04 ENCOUNTER — OFFICE VISIT (OUTPATIENT)
Dept: GASTROENTEROLOGY | Facility: CLINIC | Age: 55
End: 2025-03-04
Attending: PHYSICIAN ASSISTANT
Payer: COMMERCIAL

## 2025-03-04 VITALS
HEIGHT: 64 IN | DIASTOLIC BLOOD PRESSURE: 81 MMHG | TEMPERATURE: 97.8 F | OXYGEN SATURATION: 96 % | WEIGHT: 168 LBS | SYSTOLIC BLOOD PRESSURE: 127 MMHG | HEART RATE: 64 BPM | BODY MASS INDEX: 28.68 KG/M2

## 2025-03-04 DIAGNOSIS — K75.4 AUTOIMMUNE HEPATITIS (H): Primary | ICD-10-CM

## 2025-03-04 DIAGNOSIS — K75.4 AUTOIMMUNE HEPATITIS (H): ICD-10-CM

## 2025-03-04 PROBLEM — M79.89 SWELLING OF ARM: Status: ACTIVE | Noted: 2025-03-04

## 2025-03-04 PROBLEM — N84.0 UTERINE POLYP: Status: ACTIVE | Noted: 2025-03-04

## 2025-03-04 PROBLEM — N93.9 ABNORMAL UTERINE BLEEDING: Status: ACTIVE | Noted: 2025-03-04

## 2025-03-04 PROBLEM — M72.2 PLANTAR FASCIITIS, BILATERAL: Status: ACTIVE | Noted: 2025-03-04

## 2025-03-04 PROBLEM — G47.33 OBSTRUCTIVE SLEEP APNEA: Status: ACTIVE | Noted: 2025-03-04

## 2025-03-04 PROBLEM — E78.1 HYPERTRIGLYCERIDEMIA: Status: ACTIVE | Noted: 2025-03-04

## 2025-03-04 PROBLEM — N83.209 CYST, OVARIAN: Status: ACTIVE | Noted: 2025-03-04

## 2025-03-04 PROBLEM — R53.83 FATIGUE: Status: ACTIVE | Noted: 2025-03-04

## 2025-03-04 PROBLEM — K80.00 CALCULUS OF GALLBLADDER WITH ACUTE CHOLECYSTITIS: Status: ACTIVE | Noted: 2017-03-30

## 2025-03-04 PROBLEM — L29.9 GENERALIZED PRURITUS: Status: ACTIVE | Noted: 2019-03-29

## 2025-03-04 PROBLEM — D50.9 IRON DEFICIENCY ANEMIA: Status: ACTIVE | Noted: 2021-09-22

## 2025-03-04 PROBLEM — Z90.49 HISTORY OF CHOLECYSTECTOMY: Status: ACTIVE | Noted: 2017-04-17

## 2025-03-04 PROBLEM — R73.03 PREDIABETES: Status: ACTIVE | Noted: 2025-03-04

## 2025-03-04 PROBLEM — L85.3 DRY SKIN DERMATITIS: Status: ACTIVE | Noted: 2017-05-30

## 2025-03-04 PROBLEM — Z79.890 HORMONE REPLACEMENT THERAPY: Status: ACTIVE | Noted: 2025-03-04

## 2025-03-04 LAB
ALBUMIN SERPL BCG-MCNC: 4.7 G/DL (ref 3.5–5.2)
ALP SERPL-CCNC: 91 U/L (ref 40–150)
ALT SERPL W P-5'-P-CCNC: 25 U/L (ref 0–50)
ANION GAP SERPL CALCULATED.3IONS-SCNC: 13 MMOL/L (ref 7–15)
AST SERPL W P-5'-P-CCNC: 27 U/L (ref 0–45)
BILIRUB DIRECT SERPL-MCNC: 0.09 MG/DL (ref 0–0.3)
BILIRUB SERPL-MCNC: 0.2 MG/DL
BUN SERPL-MCNC: 14.4 MG/DL (ref 6–20)
CALCIUM SERPL-MCNC: 9.8 MG/DL (ref 8.8–10.4)
CHLORIDE SERPL-SCNC: 105 MMOL/L (ref 98–107)
CREAT SERPL-MCNC: 0.66 MG/DL (ref 0.51–0.95)
EGFRCR SERPLBLD CKD-EPI 2021: >90 ML/MIN/1.73M2
ERYTHROCYTE [DISTWIDTH] IN BLOOD BY AUTOMATED COUNT: 11.6 % (ref 10–15)
GLUCOSE SERPL-MCNC: 98 MG/DL (ref 70–99)
HAV AB SER QL IA: NONREACTIVE
HBV SURFACE AB SERPL IA-ACNC: <3.5 M[IU]/ML
HBV SURFACE AB SERPL IA-ACNC: NONREACTIVE M[IU]/ML
HCO3 SERPL-SCNC: 20 MMOL/L (ref 22–29)
HCT VFR BLD AUTO: 40.2 % (ref 35–47)
HGB BLD-MCNC: 14.6 G/DL (ref 11.7–15.7)
IGG SERPL-MCNC: 1357 MG/DL (ref 610–1616)
INR PPP: 1.04 (ref 0.85–1.15)
MCH RBC QN AUTO: 33.6 PG (ref 26.5–33)
MCHC RBC AUTO-ENTMCNC: 36.3 G/DL (ref 31.5–36.5)
MCV RBC AUTO: 92 FL (ref 78–100)
PLATELET # BLD AUTO: 318 10E3/UL (ref 150–450)
POTASSIUM SERPL-SCNC: 4.3 MMOL/L (ref 3.4–5.3)
PROT SERPL-MCNC: 8.1 G/DL (ref 6.4–8.3)
RBC # BLD AUTO: 4.35 10E6/UL (ref 3.8–5.2)
SODIUM SERPL-SCNC: 138 MMOL/L (ref 135–145)
VIT D+METAB SERPL-MCNC: 50 NG/ML (ref 20–50)
WBC # BLD AUTO: 5.6 10E3/UL (ref 4–11)

## 2025-03-04 PROCEDURE — 85610 PROTHROMBIN TIME: CPT | Performed by: PATHOLOGY

## 2025-03-04 PROCEDURE — 80053 COMPREHEN METABOLIC PANEL: CPT | Performed by: PATHOLOGY

## 2025-03-04 PROCEDURE — 36415 COLL VENOUS BLD VENIPUNCTURE: CPT | Performed by: PATHOLOGY

## 2025-03-04 PROCEDURE — 82784 ASSAY IGA/IGD/IGG/IGM EACH: CPT | Performed by: PHYSICIAN ASSISTANT

## 2025-03-04 PROCEDURE — 85027 COMPLETE CBC AUTOMATED: CPT | Performed by: PATHOLOGY

## 2025-03-04 PROCEDURE — 86708 HEPATITIS A ANTIBODY: CPT | Performed by: PHYSICIAN ASSISTANT

## 2025-03-04 PROCEDURE — 82248 BILIRUBIN DIRECT: CPT | Performed by: PATHOLOGY

## 2025-03-04 PROCEDURE — 82306 VITAMIN D 25 HYDROXY: CPT | Performed by: PHYSICIAN ASSISTANT

## 2025-03-04 PROCEDURE — 86706 HEP B SURFACE ANTIBODY: CPT | Performed by: PHYSICIAN ASSISTANT

## 2025-03-04 PROCEDURE — 99000 SPECIMEN HANDLING OFFICE-LAB: CPT | Performed by: PATHOLOGY

## 2025-03-04 PROCEDURE — 99213 OFFICE O/P EST LOW 20 MIN: CPT | Performed by: PHYSICIAN ASSISTANT

## 2025-03-04 RX ORDER — FLUOXETINE HYDROCHLORIDE 40 MG/1
40 CAPSULE ORAL DAILY
COMMUNITY
Start: 2025-02-10

## 2025-03-04 RX ORDER — OMEPRAZOLE 20 MG/1
20 TABLET, DELAYED RELEASE ORAL DAILY
COMMUNITY

## 2025-03-04 RX ORDER — AZATHIOPRINE 50 MG/1
50 TABLET ORAL DAILY
Qty: 90 TABLET | Refills: 3 | Status: SHIPPED | OUTPATIENT
Start: 2025-03-04

## 2025-03-04 RX ORDER — GEMFIBROZIL 600 MG/1
600 TABLET, FILM COATED ORAL 2 TIMES DAILY
COMMUNITY

## 2025-03-04 ASSESSMENT — PAIN SCALES - GENERAL: PAINLEVEL_OUTOF10: NO PAIN (0)

## 2025-03-04 NOTE — Clinical Note
Could you fax lab orders to [facility] [place]?  These will be due in 6 months.

## 2025-03-04 NOTE — PROGRESS NOTES
Hepatology Clinic Note  Crista Amador   Date of Birth 1970    REASON FOR FOLLOW UP: Autoimmune hepatitis          Assessment/plan:     Autoimmune hepatitis:    - Excellent allograft function at this time  - Off steroids 3/2022     Immunosuppression:  - Remains on azathioprine 50mg daily  - TPMT = 27  - Based on stability of liver tests, continue AZA 50mg daily; refill sent in   - Labs every 6 months for stability     Steatotic Liver Disease (SLD, formerly referred to as Non-alcoholic Fatty Liver Disease)  - Re-discussed metabolic dysfunction-associated steatotic liver disease. .      - Recommend exercise regularly: at least 4 times per week, with a minimum of 40-45 minutes per day.               - It has shown that patients who exercise regularly can have improvement of insulin resistance, increase in baseline metabolic activity and resolution of fatty liver disease (even if  appreciable weight loss does not occur)    - Recommend a low-carbohydrate, low-calorie diet    - Recommended Mediterranean diet     - Work on slow, gradual weight loss     - If the pt has diabetes, recommend assertive management: ideal goal Hgb A1c goal of =/< 7%.     - No overt contraindications to meds used in tx of diabetes in persons with chronic liver disease    - Management of cholesterol is also very important.    - Use of statins are an effective means of therapy and are not contra-indicated in those w/ abnormal liver tests OR those w/ cirrhosis. Value of these meds in this population outweigh minor risks of abnormal liver tests.   - Goal LDL in those with VALENCIA are < 100 mg/dL     Side Effects of Immunosuppression:  - Potential side effects of azathioprine including but not limited to: Fevers, pancreatitis, bone marrow suppression, risk of lymphoma or leukemia.    - Azathioprine use can increase risk for skin cancers, and encouraged yearly skin exams w/ PCP or dermatologist   > Pt would like to see Dermatologist through an  outside facility; she plans to discuss w/ PCP to get referral  - Follow labs every 6 months, including labs to assess for toxicity of her immunosuppression     Routine Health Care in Patient with Chronic Liver Disease:  - Recommend screening for hepatitis A and B, please vaccinate if not immune   > Added on hepatitis A antibody total and hepatitis B surface antibody to today's labs  - Recommend screening for Vit D deficiency at least twice yearly w/ aggressive supplementation/replacement    > Added on vitamin D level to today's lab work  - Recommend a screening DEXA scan to evaluate for osteoporosis.  If present, should treat with calcium, Vitamin D supplementation, and recommend consideration of bisphosphonate therapy.  Also recommend follow up DEXA scans to evaluate for improvement of bone density on therapy.  - Pts w/ liver disease should avoid NSAID meds as they can cause significant injury to kidneys in this population     Follow Up:  Recommended she discuss timing of next screening colonoscopy with PCP  Lab in 6 months; will fax orders  RTC in 12 months with labs same day    Gwen Zapata PA-C  [facility]   -----------------------------------------------------         HPI:     55 YO F w/ PMHx significant for prediabetes, JACOB, hypertriglyceridemia, hx cholecystectomy, iron-deficiency anemia, abnormal uterine bleeding, hormone replacement therapy and ovarian cyst who presents for follow-up regarding autoimmune hepatitis.    Last seen in clinic by Dr. Leventhal 3/11/2024.     Recently seen in the ER through an outside facility on 1/4/25 for concerns of chest pain.  Patient states she had significant relief after consuming GI cocktail.  ER provider was suspicious of GERD.  EKG was unremarkable.  She was instructed to continue to take Prilosec at home.    Patient states she gets Prilosec 20 mg OTC.  She typically takes this medication a few times per week. She does her best to try to  remember to take daily.  When she does take omeprazole, she finds that it controls her acid reflux symptoms.    Occasionally takes Aleve.  Admits to taking 2 vitamin D Gummies per day.  Occasionally uses NyQuil for sleep because she works at the [employer] shift.  States she was prescribed estrogen patches for hormone replacement therapy.  She states she has the patches at home.  Has not recently used them.  Is considering starting again because she is having hot flashes.    Denies any recent fevers, chills, nausea, vomiting.  No yellowing of the eyes or skin.  Appetite is good.  No lower extremity edema.  No itching.  No shortness of breath.  No confusion.  No achiness.  No bright red blood per rectum or melena.  Patient reports she did have an EGD and colonoscopy at outside facility in December 2021.  She is not sure when it was recommended that she have her next screening colonoscopy.  Patient states she eats a lot of chicken.  Avoids pizza.  Does drink 1 can of soda per day.  Admits to walking while she is at work but otherwise denies any routine physical exercise.    Former  vaping use. No current tobacco/vaping use. Admits to rare alcohol consumption. Denies illicit drug use.    Imaging:  [facility] 4/2019  liver has no overt evidence of hepatic steatosis.  No liver mass was visualized.  Liver measures 12.5 cm in craniocaudal dimension.  Patient is undergone a cholecystectomy.  The common bile duct measures 6.5 mm.  Pancreas appears unremarkable.     Procedures:  Liver biopsy: June 24, 2019.    sections show liver needle biopsy tissue measuring 2.4 cm containing approximately 15 portal tracts.  Some of the portal tracts show increased chronic inflammatory cells including numerous plasma cells and some small lymphocytes and occasional eosinophils.  The bile ducts are normal.  There is minimal interface hepatitis.  Less than 15% macrovesicular steatosis is present.  There is no ballooning degeneration.  Lobular  "inflammation is minimal.  There is no bridget formation, there is no cholestasis, there is no fibrosis.    PMH:    has a past medical history of Autoimmune hepatitis (H), Dyslipidemia, and Hypothyroidism.     SMH:    has a past surgical history that includes liver biopsy (06/24/2019); tubal ligation; and Laparoscopic cholecystectomy (03/30/2017).     Medications:   Current Outpatient Medications   Medication Sig Dispense Refill    azaTHIOprine (IMURAN) 50 MG tablet Take 1 tablet (50 mg) by mouth daily 90 tablet 3    Cholecalciferol (VITAMIN D3) 25 MCG (1000 UT) CHEW Take by mouth daily      cyanocobalamin (VITAMIN B-12) 1000 MCG tablet Take 1,000 mcg by mouth daily (Patient not taking: Reported on 3/6/2023)      estradiol (VIVELLE-DOT) 0.05 MG/24HR bi-weekly patch APPLY 1 PATCH TOPICALLY TWICE A WEEK      fenofibrate (TRICOR) 145 MG tablet daily (Patient not taking: Reported on 3/6/2023)  2    ferrous sulfate (FEROSUL) 325 (65 Fe) MG tablet Take 325 mg by mouth daily      FLUoxetine (PROZAC) 20 MG capsule TAKE 1 CAPSULE BY MOUTH ONCE DAILY  2    levothyroxine (SYNTHROID/LEVOTHROID) 50 MCG tablet TAKE 1 TABLET BY MOUTH ONCE DAILY FOR THYROID  2    multivitamin w/minerals (THERA-VIT-M) tablet Take 1 tablet by mouth daily       No current facility-administered medications for this visit.     Previous work-up:   Previous work-up:   Lab Results   Component Value Date    MEG Positive (A) 09/09/2019    ANAT1 1:1,280 09/09/2019      No results found for: \"SPECDES\", \"LDRESULTS\"    Recent Labs   Lab Test 03/11/24  0911 03/06/23  0700 03/07/22  0707 03/24/20  0000 02/25/20  0000 01/13/20  1151 11/04/19  0000 09/09/19  0938   ALKPHOS 127 124* 88 78 69 74 63 74   ALT 34 22 17 19 28 28 32 34   AST 44 26 18 23 28 15 21 23   BILITOTAL 0.2 0.2 0.4 0.4 0.3 0.3 0.2 0.3           Allergies:     Allergies   Allergen Reactions    Penicillin G           Social History:     Social History     Socioeconomic History    Marital status: " "Single     Spouse name: Not on file    Number of children: Not on file    Years of education: Not on file    Highest education level: Not on file   Occupational History    Not on file   Tobacco Use    Smoking status: Former     Types: Other    Smokeless tobacco: Never    Tobacco comments:     Former vaper quit 02/2018   Substance and Sexual Activity    Alcohol use: Yes     Comment: rare    Drug use: Never    Sexual activity: Yes     Partners: Male   Other Topics Concern    Parent/sibling w/ CABG, MI or angioplasty before 65F 55M? Not Asked   Social History Narrative    Not on file     Social Drivers of Health     Financial Resource Strain: Not on file   Food Insecurity: Not on file   Transportation Needs: Not on file   Physical Activity: Not on file   Stress: Not on file   Social Connections: Not on file   Interpersonal Safety: Not on file   Housing Stability: Not on file          Family History:     Family History   Problem Relation Age of Onset    Colon Cancer No family hx of     Autoimmune Disease No family hx of           Review of Systems:   Gen: See HPI          Physical Exam:   Vital signs:  Temp: 97.8  F (36.6  C) Temp src: Oral BP: 127/81 Pulse: 64     SpO2: 96 %     Height: 162.6 cm (5' 4\") Weight: 76.2 kg (168 lb)  Estimated body mass index is 28.84 kg/m  as calculated from the following:    Height as of this encounter: 1.626 m (5' 4\").    Weight as of this encounter: 76.2 kg (168 lb).  Gen: A&Ox3, NAD  HEENT: Sclera anicteric  CV: RRR, no overt murmurs  Lung: CTA , nonlabored breathing  Abd: soft, mild tenderness to palpation in RUQ, ND, BS+  Ext: no edema, intact pulses.   Skin: No rash or jaundice; slight redness noted on distal forearm of left arm with slight nonpitting edema  Neuro: grossly intact   Psych: appropriate mood and affects         Data:   Reviewed in person and significant for:    Lab Results   Component Value Date     03/07/2022     01/13/2020      Lab Results   Component " "Value Date    POTASSIUM 4.2 03/07/2022    POTASSIUM 4.0 04/27/2021     Lab Results   Component Value Date    CHLORIDE 105 03/07/2022    CHLORIDE 108 01/13/2020     Lab Results   Component Value Date    CO2 23 03/07/2022    CO2 25 01/13/2020     Lab Results   Component Value Date    BUN 15 03/07/2022    BUN 10 01/13/2020     Lab Results   Component Value Date    CR 0.67 03/07/2022    CR 0.7 04/27/2021       Lab Results   Component Value Date    WBC 6.4 03/11/2024    WBC 8.4 01/13/2020     Lab Results   Component Value Date    HGB 14.5 03/11/2024    HGB 13.9 01/13/2020     Lab Results   Component Value Date    HCT 40.3 03/11/2024    HCT 40.1 01/13/2020     Lab Results   Component Value Date    MCV 94 03/11/2024    MCV 98 01/13/2020     Lab Results   Component Value Date     03/11/2024     01/13/2020       Lab Results   Component Value Date    AST 44 03/11/2024    AST 23 03/24/2020     Lab Results   Component Value Date    ALT 34 03/11/2024    ALT 19 03/24/2020     No results found for: \"BILICONJ\"   Lab Results   Component Value Date    BILITOTAL 0.2 03/11/2024    BILITOTAL 0.4 03/24/2020       Lab Results   Component Value Date    ALBUMIN 4.5 03/11/2024    ALBUMIN 3.7 03/07/2022    ALBUMIN 3.2 03/24/2020     Lab Results   Component Value Date    PROTTOTAL 7.7 03/11/2024    PROTTOTAL 7.4 03/24/2020      Lab Results   Component Value Date    ALKPHOS 127 03/11/2024    ALKPHOS 78 03/24/2020       Lab Results   Component Value Date    INR 1.09 03/07/2022    INR 1.05 01/13/2020     "

## 2025-03-04 NOTE — LETTER
3/4/2025      Crista Amador  [address]  [facility] [address]      Dear Colleague,    Thank you for referring your patient, Crista Amador, to the [facility] [place]. Please see a copy of my visit note below.    Hepatology Clinic Note  Crista Amador   Date of Birth 1970    REASON FOR FOLLOW UP: Autoimmune hepatitis          Assessment/plan:     Autoimmune hepatitis:    - Excellent allograft function at this time  - Off steroids 3/2022     Immunosuppression:  - Remains on azathioprine 50mg daily  - TPMT = 27  - Based on stability of liver tests, continue AZA 50mg daily; refill sent in   - Labs every 6 months for stability     Steatotic Liver Disease (SLD, formerly referred to as Non-alcoholic Fatty Liver Disease)  - Re-discussed metabolic dysfunction-associated steatotic liver disease. .      - Recommend exercise regularly: at least 4 times per week, with a minimum of 40-45 minutes per day.               - It has shown that patients who exercise regularly can have improvement of insulin resistance, increase in baseline metabolic activity and resolution of fatty liver disease (even if  appreciable weight loss does not occur)    - Recommend a low-carbohydrate, low-calorie diet    - Recommended Mediterranean diet     - Work on slow, gradual weight loss     - If the pt has diabetes, recommend assertive management: ideal goal Hgb A1c goal of =/< 7%.     - No overt contraindications to meds used in tx of diabetes in persons with chronic liver disease    - Management of cholesterol is also very important.    - Use of statins are an effective means of therapy and are not contra-indicated in those w/ abnormal liver tests OR those w/ cirrhosis. Value of these meds in this population outweigh minor risks of abnormal liver tests.   - Goal LDL in those with VALENCIA are < 100 mg/dL     Side Effects of Immunosuppression:  - Potential side effects of azathioprine including but  not limited to: Fevers, pancreatitis, bone marrow suppression, risk of lymphoma or leukemia.    - Azathioprine use can increase risk for skin cancers, and encouraged yearly skin exams w/ PCP or dermatologist   > Pt would like to see Dermatologist through an outside facility; she plans to discuss w/ PCP to get referral  - Follow labs every 6 months, including labs to assess for toxicity of her immunosuppression     Routine Health Care in Patient with Chronic Liver Disease:  - Recommend screening for hepatitis A and B, please vaccinate if not immune   > Added on hepatitis A antibody total and hepatitis B surface antibody to today's labs  - Recommend screening for Vit D deficiency at least twice yearly w/ aggressive supplementation/replacement    > Added on vitamin D level to today's lab work  - Recommend a screening DEXA scan to evaluate for osteoporosis.  If present, should treat with calcium, Vitamin D supplementation, and recommend consideration of bisphosphonate therapy.  Also recommend follow up DEXA scans to evaluate for improvement of bone density on therapy.  - Pts w/ liver disease should avoid NSAID meds as they can cause significant injury to kidneys in this population     Follow Up:  Recommended she discuss timing of next screening colonoscopy with PCP  Lab in 6 months; will fax orders  RTC in 12 months with labs same day    Gwen Zapata PA-C  [facility]   -----------------------------------------------------         HPI:     53 YO F w/ PMHx significant for prediabetes, JACOB, hypertriglyceridemia, hx cholecystectomy, iron-deficiency anemia, abnormal uterine bleeding, hormone replacement therapy and ovarian cyst who presents for follow-up regarding autoimmune hepatitis.    Last seen in clinic by Dr. Leventhal 3/11/2024.     Recently seen in the ER through an outside facility on 1/4/25 for concerns of chest pain.  Patient states she had significant relief after consuming GI  rodriguez.  ER provider was suspicious of GERD.  EKG was unremarkable.  She was instructed to continue to take Prilosec at home.    Patient states she gets Prilosec 20 mg OTC.  She typically takes this medication a few times per week. She does her best to try to remember to take daily.  When she does take omeprazole, she finds that it controls her acid reflux symptoms.    Occasionally takes Aleve.  Admits to taking 2 vitamin D Gummies per day.  Occasionally uses NyQuil for sleep because she works at the [employer] shift.  States she was prescribed estrogen patches for hormone replacement therapy.  She states she has the patches at home.  Has not recently used them.  Is considering starting again because she is having hot flashes.    Denies any recent fevers, chills, nausea, vomiting.  No yellowing of the eyes or skin.  Appetite is good.  No lower extremity edema.  No itching.  No shortness of breath.  No confusion.  No achiness.  No bright red blood per rectum or melena.  Patient reports she did have an EGD and colonoscopy at outside facility in December 2021.  She is not sure when it was recommended that she have her next screening colonoscopy.  Patient states she eats a lot of chicken.  Avoids pizza.  Does drink 1 can of soda per day.  Admits to walking while she is at work but otherwise denies any routine physical exercise.    Former  vaping use. No current tobacco/vaping use. Admits to rare alcohol consumption. Denies illicit drug use.    Imaging:  [facility] 4/2019  liver has no overt evidence of hepatic steatosis.  No liver mass was visualized.  Liver measures 12.5 cm in craniocaudal dimension.  Patient is undergone a cholecystectomy.  The common bile duct measures 6.5 mm.  Pancreas appears unremarkable.     Procedures:  Liver biopsy: June 24, 2019.    sections show liver needle biopsy tissue measuring 2.4 cm containing approximately 15 portal tracts.  Some of the portal tracts show increased chronic inflammatory cells  "including numerous plasma cells and some small lymphocytes and occasional eosinophils.  The bile ducts are normal.  There is minimal interface hepatitis.  Less than 15% macrovesicular steatosis is present.  There is no ballooning degeneration.  Lobular inflammation is minimal.  There is no bridget formation, there is no cholestasis, there is no fibrosis.    PMH:    has a past medical history of Autoimmune hepatitis (H), Dyslipidemia, and Hypothyroidism.     SMH:    has a past surgical history that includes liver biopsy (06/24/2019); tubal ligation; and Laparoscopic cholecystectomy (03/30/2017).     Medications:   Current Outpatient Medications   Medication Sig Dispense Refill     azaTHIOprine (IMURAN) 50 MG tablet Take 1 tablet (50 mg) by mouth daily 90 tablet 3     Cholecalciferol (VITAMIN D3) 25 MCG (1000 UT) CHEW Take by mouth daily       cyanocobalamin (VITAMIN B-12) 1000 MCG tablet Take 1,000 mcg by mouth daily (Patient not taking: Reported on 3/6/2023)       estradiol (VIVELLE-DOT) 0.05 MG/24HR bi-weekly patch APPLY 1 PATCH TOPICALLY TWICE A WEEK       fenofibrate (TRICOR) 145 MG tablet daily (Patient not taking: Reported on 3/6/2023)  2     ferrous sulfate (FEROSUL) 325 (65 Fe) MG tablet Take 325 mg by mouth daily       FLUoxetine (PROZAC) 20 MG capsule TAKE 1 CAPSULE BY MOUTH ONCE DAILY  2     levothyroxine (SYNTHROID/LEVOTHROID) 50 MCG tablet TAKE 1 TABLET BY MOUTH ONCE DAILY FOR THYROID  2     multivitamin w/minerals (THERA-VIT-M) tablet Take 1 tablet by mouth daily       No current facility-administered medications for this visit.     Previous work-up:   Previous work-up:   Lab Results   Component Value Date    MEG Positive (A) 09/09/2019    ANAT1 1:1,280 09/09/2019      No results found for: \"SPECDES\", \"LDRESULTS\"    Recent Labs   Lab Test 03/11/24  0911 03/06/23  0700 03/07/22  0707 03/24/20  0000 02/25/20  0000 01/13/20  1151 11/04/19  0000 09/09/19  0938   ALKPHOS 127 124* 88 78 69 74 63 74   ALT 34 " "22 17 19 28 28 32 34   AST 44 26 18 23 28 15 21 23   BILITOTAL 0.2 0.2 0.4 0.4 0.3 0.3 0.2 0.3           Allergies:     Allergies   Allergen Reactions     Penicillin G           Social History:     Social History     Socioeconomic History     Marital status: Single     Spouse name: Not on file     Number of children: Not on file     Years of education: Not on file     Highest education level: Not on file   Occupational History     Not on file   Tobacco Use     Smoking status: Former     Types: Other     Smokeless tobacco: Never     Tobacco comments:     Former vaper quit 02/2018   Substance and Sexual Activity     Alcohol use: Yes     Comment: rare     Drug use: Never     Sexual activity: Yes     Partners: Male   Other Topics Concern     Parent/sibling w/ CABG, MI or angioplasty before 65F 55M? Not Asked   Social History Narrative     Not on file     Social Drivers of Health     Financial Resource Strain: Not on file   Food Insecurity: Not on file   Transportation Needs: Not on file   Physical Activity: Not on file   Stress: Not on file   Social Connections: Not on file   Interpersonal Safety: Not on file   Housing Stability: Not on file          Family History:     Family History   Problem Relation Age of Onset     Colon Cancer No family hx of      Autoimmune Disease No family hx of           Review of Systems:   Gen: See HPI          Physical Exam:   Vital signs:  Temp: 97.8  F (36.6  C) Temp src: Oral BP: 127/81 Pulse: 64     SpO2: 96 %     Height: 162.6 cm (5' 4\") Weight: 76.2 kg (168 lb)  Estimated body mass index is 28.84 kg/m  as calculated from the following:    Height as of this encounter: 1.626 m (5' 4\").    Weight as of this encounter: 76.2 kg (168 lb).  Gen: A&Ox3, NAD  HEENT: Sclera anicteric  CV: RRR, no overt murmurs  Lung: CTA , nonlabored breathing  Abd: soft, mild tenderness to palpation in RUQ, ND, BS+  Ext: no edema, intact pulses.   Skin: No rash or jaundice; slight redness noted on distal " "forearm of left arm with slight nonpitting edema  Neuro: grossly intact   Psych: appropriate mood and affects         Data:   Reviewed in person and significant for:    Lab Results   Component Value Date     03/07/2022     01/13/2020      Lab Results   Component Value Date    POTASSIUM 4.2 03/07/2022    POTASSIUM 4.0 04/27/2021     Lab Results   Component Value Date    CHLORIDE 105 03/07/2022    CHLORIDE 108 01/13/2020     Lab Results   Component Value Date    CO2 23 03/07/2022    CO2 25 01/13/2020     Lab Results   Component Value Date    BUN 15 03/07/2022    BUN 10 01/13/2020     Lab Results   Component Value Date    CR 0.67 03/07/2022    CR 0.7 04/27/2021       Lab Results   Component Value Date    WBC 6.4 03/11/2024    WBC 8.4 01/13/2020     Lab Results   Component Value Date    HGB 14.5 03/11/2024    HGB 13.9 01/13/2020     Lab Results   Component Value Date    HCT 40.3 03/11/2024    HCT 40.1 01/13/2020     Lab Results   Component Value Date    MCV 94 03/11/2024    MCV 98 01/13/2020     Lab Results   Component Value Date     03/11/2024     01/13/2020       Lab Results   Component Value Date    AST 44 03/11/2024    AST 23 03/24/2020     Lab Results   Component Value Date    ALT 34 03/11/2024    ALT 19 03/24/2020     No results found for: \"BILICONJ\"   Lab Results   Component Value Date    BILITOTAL 0.2 03/11/2024    BILITOTAL 0.4 03/24/2020       Lab Results   Component Value Date    ALBUMIN 4.5 03/11/2024    ALBUMIN 3.7 03/07/2022    ALBUMIN 3.2 03/24/2020     Lab Results   Component Value Date    PROTTOTAL 7.7 03/11/2024    PROTTOTAL 7.4 03/24/2020      Lab Results   Component Value Date    ALKPHOS 127 03/11/2024    ALKPHOS 78 03/24/2020       Lab Results   Component Value Date    INR 1.09 03/07/2022    INR 1.05 01/13/2020         Again, thank you for allowing me to participate in the care of your patient.        Sincerely,        Gwen Zapata PA-C    Electronically signed"

## 2025-03-04 NOTE — NURSING NOTE
"Chief Complaint   Patient presents with    RECHECK     Follow Up     /81   Pulse 64   Temp 97.8  F (36.6  C) (Oral)   Ht 1.626 m (5' 4\")   Wt 76.2 kg (168 lb)   SpO2 96%   BMI 28.84 kg/m    Sallie Hernández on 3/4/2025 at 10:00 AM    "